# Patient Record
Sex: FEMALE | Race: WHITE | NOT HISPANIC OR LATINO | Employment: FULL TIME | URBAN - NONMETROPOLITAN AREA
[De-identification: names, ages, dates, MRNs, and addresses within clinical notes are randomized per-mention and may not be internally consistent; named-entity substitution may affect disease eponyms.]

---

## 2017-03-10 ENCOUNTER — OFFICE VISIT (OUTPATIENT)
Dept: MEDICAL GROUP | Facility: PHYSICIAN GROUP | Age: 22
End: 2017-03-10
Payer: COMMERCIAL

## 2017-03-10 VITALS
BODY MASS INDEX: 21.99 KG/M2 | TEMPERATURE: 97.5 F | SYSTOLIC BLOOD PRESSURE: 108 MMHG | OXYGEN SATURATION: 99 % | RESPIRATION RATE: 18 BRPM | DIASTOLIC BLOOD PRESSURE: 80 MMHG | HEIGHT: 65 IN | WEIGHT: 132 LBS | HEART RATE: 110 BPM

## 2017-03-10 DIAGNOSIS — Z00.00 HEALTHCARE MAINTENANCE: ICD-10-CM

## 2017-03-10 DIAGNOSIS — F17.200 TOBACCO USE DISORDER: ICD-10-CM

## 2017-03-10 PROCEDURE — 99203 OFFICE O/P NEW LOW 30 MIN: CPT | Performed by: NURSE PRACTITIONER

## 2017-03-10 ASSESSMENT — PAIN SCALES - GENERAL: PAINLEVEL: NO PAIN

## 2017-03-10 ASSESSMENT — PATIENT HEALTH QUESTIONNAIRE - PHQ9: CLINICAL INTERPRETATION OF PHQ2 SCORE: 0

## 2017-03-10 NOTE — PATIENT INSTRUCTIONS
Pick a quit date, start medication 1-2 weeks before then stop smoking on your quit date.    When you are about a week away from running out, have pharmacy call for refill on maintenance pack    Follow up with me in 3 months, sooner if needed--especially if you do not tolerate the side effects (dreams)    Continue with healthy eating, regular physical activity and maintaining your healthy weight

## 2017-03-10 NOTE — ASSESSMENT & PLAN NOTE
Patient states she has had her first Pap smear with her gynecologist in [place]. She does report to me a family history of ovarian cancer, specifically in her mother. She also reports to me a family history of breast cancer and her maternal grandmother as well as her maternal great-grandmother. She is currently on oral contraceptives. She did not get her flu shot this year, but will get one in the fall.  I will discuss with her at her follow-up appointment in one month the importance of her discussing with her gynecologist her family history of breast and ovarian cancer and possible need for early screening.

## 2017-03-10 NOTE — MR AVS SNAPSHOT
"        Jo Ann Vazqeuz   3/10/2017 11:20 AM   Office Visit   MRN: [id number]    Department:  [facility]   Dept Phone:  [phone]    Description:  Female : 1995   Provider:  BERNIE Navarro           Reason for Visit     New Patient est care and quit smoking.       Allergies as of 3/10/2017     No Known Allergies      You were diagnosed with     Tobacco use disorder   [305.1.ICD-9-CM]         Vital Signs     Blood Pressure Pulse Temperature Respirations Height Weight    108/80 mmHg 110 36.4 °C (97.5 °F) 18 1.638 m (5' 4.5\") 59.875 kg (132 lb)    Body Mass Index Oxygen Saturation Last Menstrual Period Breastfeeding? Smoking Status       22.32 kg/m2 99% 2017 No Current Every Day Smoker       Basic Information     Date Of Birth Sex Race Ethnicity Preferred Language    1995 Female White Non- English      Your appointments     2017 10:20 AM   Established Patient with BERNIE Navarro   [address] [address] [phone]           You will be receiving a confirmation call a few days before your appointment from our automated call confirmation system.              Problem List              ICD-10-CM Priority Class Noted - Resolved    Other psoriasis L40.8   2009 - Present    Tobacco use disorder F17.200   3/10/2017 - Present      Health Maintenance        Date Due Completion Dates    IMM HEP B VACCINE (1 of 3 - Primary Series) 1995 ---    IMM VARICELLA (CHICKENPOX) VACCINE (2 of 2 - 2 Dose Adolescent Series) 10/20/2009 2009    IMM HEP A VACCINE (2 of 2 - Standard Series) 3/22/2010 2009    IMM PNEUMOCOCCAL 19-64 (ADULT) MEDIUM RISK SERIES (1 of 1 - PPSV23) 2013 ---    PAP SMEAR 2015 ---    IMM INFLUENZA (1) 10/2/2017 (Originally 2016) ---    IMM DTaP/Tdap/Td Vaccine (2 - Td) 2019            Current Immunizations     HPV Quadrivalent " Vaccine (GARDASIL) 10/19/2007, 4/9/2007, 2/7/2007    Hepatitis A Vaccine, Ped/Adol 9/22/2009    Tdap Vaccine 9/22/2009    Varicella Vaccine Live 9/22/2009      Below and/or attached are the medications your provider expects you to take. Review all of your home medications and newly ordered medications with your provider and/or pharmacist. Follow medication instructions as directed by your provider and/or pharmacist. Please keep your medication list with you and share with your provider. Update the information when medications are discontinued, doses are changed, or new medications (including over-the-counter products) are added; and carry medication information at all times in the event of emergency situations     Allergies:  No Known Allergies          Medications  Valid as of: March 10, 2017 - 11:53 AM    Generic Name Brand Name Tablet Size Instructions for use    Norethindrone Acet-Ethinyl Est   Take  by mouth.        Varenicline Tartrate (Misc) CHANTIX ERIC 0.5 MG X 11 & 1 MG X 42 Take according to starter pack instructions        .                 Medicines prescribed today were sent to:     [facility] [address] [address]    Phone: [phone] Fax: [phone]    Open 24 Hours?: No    \[facility]\"" [facility] - [facility] [address] [place] [address]    [address] [place] [address]    Phone: [phone] Fax: [phone]    Open 24 Hours?: No      Medication refill instructions:       If your prescription bottle indicates you have medication refills left, it is not necessary to call your provider’s office. Please contact your pharmacy and they will refill your medication.    If your prescription bottle indicates you do not have any refills left, you may request refills at any time through one of the following ways: The online [employer]leep[employer] system (except Urgent Care), by calling your provider’s office, or by asking your pharmacy to contact your  provider’s office with a refill request. Medication refills are processed only during regular business hours and may not be available until the next business day. Your provider may request additional information or to have a follow-up visit with you prior to refilling your medication.   *Please Note: Medication refills are assigned a new Rx number when refilled electronically. Your pharmacy may indicate that no refills were authorized even though a new prescription for the same medication is available at the pharmacy. Please request the medicine by name with the pharmacy before contacting your provider for a refill.        Instructions    Pick a quit date, start medication 1-2 weeks before then stop smoking on your quit date.    When you are about a week away from running out, have pharmacy call for refill on maintenance pack    Follow up with me in 3 months, sooner if needed--especially if you do not tolerate the side effects (dreams)    Continue with healthy eating, regular physical activity and maintaining your healthy weight          MyChart Status: Patient Declined        Quit Tobacco Information     Do you want to quit using tobacco?    Quitting tobacco decreases risks of cancer, heart and lung disease, increases life expectancy, improves sense of taste and smell, and increases spending money, among other benefits.    If you are thinking about quitting, we can help.  • [facility] Quit Tobacco Program: [phone]  o Program occurs weekly for four weeks and includes pharmacist consultation on products to support quitting smoking or chewing tobacco. A provider referral is needed for pharmacist consultation.  • Tobacco Users Help Hotline: 5-800QUIT-NOW (960-5338) or https://[place].quitlogix.org/  o Free, confidential telephone and online coaching for [place] residents. Sessions are designed on a schedule that is convenient for you. Eligible clients receive free nicotine replacement therapy.  • Nationally:  www.smokefree.gov  o Information and professional assistance to support both immediate and long-term needs as you become, and remain, a non-smoker. Smokefree.gov allows you to choose the help that best fits your needs.

## 2017-03-10 NOTE — PROGRESS NOTES
"Chief Complaint   Patient presents with   • New Patient     est care and quit smoking.          This is a 21 y.o.female patient that presents today with the following: Establish care with new PCP, assistance with tobacco cessation.    Tobacco use disorder  Chronic in nature, current everyday smoker about 5-6 cigarettes per day. She has smoked for approximately 2 years. However she smokes daily varies depending on day and who she is around. She has tried vaping, the nicorette gum, the nicorette inhalers, weaning down on the cigarettes and \"cold turkey.\" Nothing has working. She understands the risks tobacco use and poses on her health and she is ready to quit. She has heard about Chantix and she wants to try this.  I did discuss with her at length the risks, benefits and common side effects associated with this medication. I did tell her the most common side effect but this is bizarre and often vivid dreams, sometimes even nightmares. I did tell her that if these are bothersome there is a medication that we can prescribe for that, doxazosin. She is to follow up with me in 3 months, sooner if needed. We will call and Chantix for her, she was instructed on how to take this medication.    Healthcare maintenance  Patient states she has had her first Pap smear with her gynecologist in [place]. She does report to me a family history of ovarian cancer, specifically in her mother. She also reports to me a family history of breast cancer and her maternal grandmother as well as her maternal great-grandmother. She is currently on oral contraceptives. She did not get her flu shot this year, but will get one in the fall.  I will discuss with her at her follow-up appointment in one month the importance of her discussing with her gynecologist her family history of breast and ovarian cancer and possible need for early screening.    clinical course has been stable    Past Medical History   Diagnosis Date   • Other psoriasis        Past " "Surgical History   Procedure Laterality Date   • Other       none reported       Family History   Problem Relation Age of Onset   • Diabetes Maternal Grandfather    • Cancer Mother      ovarian   • Cancer Maternal Grandmother        Review of patient's allergies indicates no known allergies.    Current Outpatient Prescriptions Ordered in [facility]   Medication Sig Dispense Refill   • Norethindrone Acet-Ethinyl Est (JUNEL 1/20 PO) Take  by mouth.     • varenicline (CHANTIX STARTING MONTH PAK) 0.5 MG X 11 & 1 MG X 42 tablet Take according to starter pack instructions 56 Tab 3     No current Epic-ordered facility-administered medications on file.       Constitutional ROS: No unexpected change in weight, No weakness, No unexplained fevers, sweats, or chills  Pulmonary ROS: No chronic cough, sputum, or hemoptysis, No shortness of breath, No recent change in breathing, Positive for smoker, current everyday--per history of present illness  Cardiovascular ROS: No exercise intolerance, No chest pain, No edema, No palpitations  Gastrointestinal ROS: No abdominal pain, No nausea, vomiting, diarrhea, or constipation, no blood in stool  Musculoskeletal/Extremities ROS: No clubbing, No cyanosis, No pain, redness or swelling on the joints  Neurologic ROS: Normal development, No seizures, No weakness  All other systems reviewed and are within normal limits    Physical exam:  /80 mmHg  Pulse 110  Temp(Src) 36.4 °C (97.5 °F)  Resp 18  Ht 1.638 m (5' 4.5\")  Wt 59.875 kg (132 lb)  BMI 22.32 kg/m2  SpO2 99%  LMP 02/17/2017  Breastfeeding? No  General Appearance: Young female, alert, no distress, well-nourished, well-groomed  Skin: Skin color, texture, turgor normal. No rashes or lesions.  Lungs: negative findings: normal respiratory rate and rhythm, lungs clear to auscultation  Heart: negative. RRR without murmur, gallop, or rubs.  No ectopy.  Abdomen: Abdomen soft, non-tender. BS normal. No masses,  No " organomegaly  Musculoskeletal: negative  Neurologic: intact, oriented, mood appropriate, judgment intact. Cranial nerves II through XII grossly intact    Medical decision making/discussion: Patient here to establish care with new PCP and is requesting assistance with tobacco cessation. We will will have her try Chantix. She was counseled on risks, benefits and common side effects of this medication. She was also instructed on how to take this medication appropriately. She is to follow-up with me in 3 months, sooner if needed, especially if she does not tolerate side effects associated with dreams. She is to continue with healthy eating, regular physical activity and maintain her healthy weight.    Jo Ann Spencer was seen today for new patient.    Diagnoses and all orders for this visit:    Tobacco use disorder  -     varenicline (CHANTIX STARTING MONTH ERIC) 0.5 MG X 11 & 1 MG X 42 tablet; Take according to starter pack instructions    Healthcare maintenance          Please note that this dictation was created using voice recognition software. I have made every reasonable attempt to correct obvious errors, but I expect that there are errors of grammar and possibly content that I did not discover before finalizing the note.

## 2017-03-10 NOTE — ASSESSMENT & PLAN NOTE
"Chronic in nature, current everyday smoker about 5-6 cigarettes per day. She has smoked for approximately 2 years. However she smokes daily varies depending on day and who she is around. She has tried vaping, the nicorette gum, the nicorette inhalers, weaning down on the cigarettes and \"cold turkey.\" Nothing has working. She understands the risks tobacco use and poses on her health and she is ready to quit. She has heard about Chantix and she wants to try this.  I did discuss with her at length the risks, benefits and common side effects associated with this medication. I did tell her the most common side effect but this is bizarre and often vivid dreams, sometimes even nightmares. I did tell her that if these are bothersome there is a medication that we can prescribe for that, doxazosin. She is to follow up with me in one month. We will call and Chantix for her, she was instructed on how to take this medication.  "

## 2017-07-31 ENCOUNTER — HOSPITAL ENCOUNTER (OUTPATIENT)
Facility: MEDICAL CENTER | Age: 22
End: 2017-07-31
Attending: OBSTETRICS & GYNECOLOGY
Payer: COMMERCIAL

## 2017-07-31 PROCEDURE — 88175 CYTOPATH C/V AUTO FLUID REDO: CPT

## 2017-07-31 PROCEDURE — 87491 CHLMYD TRACH DNA AMP PROBE: CPT

## 2017-07-31 PROCEDURE — 87624 HPV HI-RISK TYP POOLED RSLT: CPT

## 2017-07-31 PROCEDURE — 87591 N.GONORRHOEAE DNA AMP PROB: CPT

## 2017-08-01 LAB
C TRACH DNA GENITAL QL NAA+PROBE: NEGATIVE
CYTOLOGY REG CYTOL: NORMAL
N GONORRHOEA DNA GENITAL QL NAA+PROBE: NEGATIVE
SPECIMEN SOURCE: NORMAL

## 2017-08-04 LAB
HPV HR 12 DNA CVX QL NAA+PROBE: NEGATIVE
HPV16 DNA SPEC QL NAA+PROBE: NEGATIVE
HPV18 DNA SPEC QL NAA+PROBE: NEGATIVE
SPECIMEN SOURCE: NORMAL

## 2017-10-30 ENCOUNTER — HOSPITAL ENCOUNTER (OUTPATIENT)
Facility: MEDICAL CENTER | Age: 22
End: 2017-10-30
Attending: PHYSICIAN ASSISTANT
Payer: COMMERCIAL

## 2017-10-30 ENCOUNTER — OFFICE VISIT (OUTPATIENT)
Dept: URGENT CARE | Facility: PHYSICIAN GROUP | Age: 22
End: 2017-10-30
Payer: COMMERCIAL

## 2017-10-30 VITALS
DIASTOLIC BLOOD PRESSURE: 80 MMHG | RESPIRATION RATE: 20 BRPM | WEIGHT: 138.4 LBS | OXYGEN SATURATION: 98 % | SYSTOLIC BLOOD PRESSURE: 120 MMHG | HEIGHT: 65 IN | HEART RATE: 114 BPM | BODY MASS INDEX: 23.06 KG/M2 | TEMPERATURE: 99.8 F

## 2017-10-30 DIAGNOSIS — N12 PYELONEPHRITIS: Primary | ICD-10-CM

## 2017-10-30 DIAGNOSIS — R30.0 DYSURIA: ICD-10-CM

## 2017-10-30 DIAGNOSIS — N12 PYELONEPHRITIS: ICD-10-CM

## 2017-10-30 LAB
APPEARANCE UR: NORMAL
BILIRUB UR STRIP-MCNC: NORMAL MG/DL
COLOR UR AUTO: YELLOW
GLUCOSE UR STRIP.AUTO-MCNC: NORMAL MG/DL
KETONES UR STRIP.AUTO-MCNC: NORMAL MG/DL
LEUKOCYTE ESTERASE UR QL STRIP.AUTO: NORMAL
NITRITE UR QL STRIP.AUTO: NORMAL
PH UR STRIP.AUTO: 7.5 [PH] (ref 5–8)
PROT UR QL STRIP: 30 MG/DL
RBC UR QL AUTO: NORMAL
SP GR UR STRIP.AUTO: 1.01
UROBILINOGEN UR STRIP-MCNC: NORMAL MG/DL

## 2017-10-30 PROCEDURE — 87077 CULTURE AEROBIC IDENTIFY: CPT

## 2017-10-30 PROCEDURE — 87086 URINE CULTURE/COLONY COUNT: CPT

## 2017-10-30 PROCEDURE — 81002 URINALYSIS NONAUTO W/O SCOPE: CPT | Performed by: PHYSICIAN ASSISTANT

## 2017-10-30 PROCEDURE — 99203 OFFICE O/P NEW LOW 30 MIN: CPT | Performed by: PHYSICIAN ASSISTANT

## 2017-10-30 PROCEDURE — 87186 SC STD MICRODIL/AGAR DIL: CPT

## 2017-10-30 RX ORDER — PHENAZOPYRIDINE HYDROCHLORIDE 200 MG/1
200 TABLET, FILM COATED ORAL 3 TIMES DAILY
Qty: 6 TAB | Refills: 0 | Status: SHIPPED | OUTPATIENT
Start: 2017-10-30 | End: 2017-10-30 | Stop reason: SDUPTHER

## 2017-10-30 RX ORDER — CIPROFLOXACIN 500 MG/1
500 TABLET, FILM COATED ORAL EVERY 12 HOURS
Qty: 14 TAB | Refills: 0 | Status: SHIPPED | OUTPATIENT
Start: 2017-10-30 | End: 2017-10-30 | Stop reason: SDUPTHER

## 2017-10-30 RX ORDER — PHENAZOPYRIDINE HYDROCHLORIDE 200 MG/1
200 TABLET, FILM COATED ORAL 3 TIMES DAILY
Qty: 6 TAB | Refills: 0 | Status: SHIPPED | OUTPATIENT
Start: 2017-10-30 | End: 2017-11-01

## 2017-10-30 RX ORDER — CIPROFLOXACIN 500 MG/1
500 TABLET, FILM COATED ORAL EVERY 12 HOURS
Qty: 14 TAB | Refills: 0 | Status: SHIPPED | OUTPATIENT
Start: 2017-10-30 | End: 2017-11-06

## 2017-10-30 NOTE — PROGRESS NOTES
Chief Complaint   Patient presents with   • UTI     frequency, burning, back pain       HISTORY OF PRESENT ILLNESS: Patient is a 21 y.o. female who presents today becauseShe has a one week history of increased urinary urgency, frequency, dysuria, she also has right-sided flank pain. No fevers, chills, nausea, vomiting or diarrhea. She has tried some over-the-counter Azo, which did not help.    Patient Active Problem List    Diagnosis Date Noted   • Tobacco use disorder 03/10/2017   • Healthcare maintenance 03/10/2017   • Other psoriasis 09/16/2009       Allergies:Review of patient's allergies indicates no known allergies.    Current Outpatient Prescriptions Ordered in [facility]   Medication Sig Dispense Refill   • phenazopyridine (PYRIDIUM) 200 MG Tab Take 1 Tab by mouth 3 times a day for 2 days. 6 Tab 0   • ciprofloxacin (CIPRO) 500 MG Tab Take 1 Tab by mouth every 12 hours for 7 days. 14 Tab 0   • Norethindrone Acet-Ethinyl Est (JUNEL 1/20 PO) Take  by mouth.     • varenicline (CHANTIX STARTING MONTH PAK) 0.5 MG X 11 & 1 MG X 42 tablet Take according to starter pack instructions 56 Tab 3     No current Epic-ordered facility-administered medications on file.        Past Medical History:   Diagnosis Date   • Other psoriasis        Social History   Substance Use Topics   • Smoking status: Former Smoker     Packs/day: 0.15     Years: 2.00     Types: Cigarettes     Quit date: 6/1/2017   • Smokeless tobacco: Never Used   • Alcohol use No       Family Status   Relation Status   • Father Other    dad's side is unknown   • Maternal Grandfather Alive   • Mother Alive   • Maternal Grandmother      Family History   Problem Relation Age of Onset   • Diabetes Maternal Grandfather    • Cancer Mother      ovarian   • Cancer Maternal Grandmother        ROS:  Review of Systems   Constitutional: Negative for fever, chills, weight loss and malaise/fatigue.   HENT: Negative for ear pain, nosebleeds, congestion, sore throat and neck pain.   "  Eyes: Negative for blurred vision.   Respiratory: Negative for cough, sputum production, shortness of breath and wheezing.    Cardiovascular: Negative for chest pain, palpitations, orthopnea and leg swelling.   Gastrointestinal: Negative for heartburn, nausea, vomiting and abdominal pain.   Genitourinary:Positive for dysuria, urgency and frequency. Positive right flank pain    Exam:  Blood pressure 120/80, pulse (!) 114, temperature 37.7 °C (99.8 °F), resp. rate 20, height 1.638 m (5' 4.5\"), weight 62.8 kg (138 lb 6.4 oz), last menstrual period 10/20/2017, SpO2 98 %, not currently breastfeeding.  General:  Well nourished, well developed female in NAD  Head:Normocephalic, atraumatic  Eyes: PERRLA, EOM within normal limits, no conjunctival injection, no scleral icterus, visual fields and acuity grossly intact.  Extremities: no clubbing, cyanosis, or edema.    Urinalysis in the office is cloudy, large amount of leukocyte esterase, positive nitrites, 1+ protein, small amount of blood.    Please note that this dictation was created using voice recognition software. I have made every reasonable attempt to correct obvious errors, but I expect that there are errors of grammar and possibly content that I did not discover before finalizing the note.    Assessment/Plan:  1. Pyelonephritis  Urine Culture    ciprofloxacin (CIPRO) 500 MG Tab   2. Dysuria  POCT Urinalysis    phenazopyridine (PYRIDIUM) 200 MG Tab   , Increase by mouth fluids    Followup with primary care in the next 7-10 days if not significantly improving, return to the urgent care or go to the emergency room sooner for any worsening of symptoms.       "

## 2017-11-01 LAB
BACTERIA UR CULT: ABNORMAL
SIGNIFICANT IND 70042: ABNORMAL
SOURCE SOURCE: ABNORMAL

## 2018-10-20 ENCOUNTER — OFFICE VISIT (OUTPATIENT)
Dept: URGENT CARE | Facility: PHYSICIAN GROUP | Age: 23
End: 2018-10-20
Payer: COMMERCIAL

## 2018-10-20 VITALS
SYSTOLIC BLOOD PRESSURE: 116 MMHG | HEIGHT: 65 IN | BODY MASS INDEX: 23.32 KG/M2 | TEMPERATURE: 98.8 F | HEART RATE: 87 BPM | DIASTOLIC BLOOD PRESSURE: 70 MMHG | OXYGEN SATURATION: 100 % | RESPIRATION RATE: 12 BRPM | WEIGHT: 140 LBS

## 2018-10-20 DIAGNOSIS — Z32.02 PREGNANCY EXAMINATION OR TEST, NEGATIVE RESULT: ICD-10-CM

## 2018-10-20 LAB
INT CON NEG: NORMAL
INT CON POS: NORMAL
POC URINE PREGNANCY TEST: NORMAL

## 2018-10-20 PROCEDURE — 99212 OFFICE O/P EST SF 10 MIN: CPT | Performed by: NURSE PRACTITIONER

## 2018-10-20 PROCEDURE — 81025 URINE PREGNANCY TEST: CPT | Performed by: NURSE PRACTITIONER

## 2018-10-20 ASSESSMENT — ENCOUNTER SYMPTOMS: ABDOMINAL PAIN: 0

## 2018-10-20 NOTE — PROGRESS NOTES
"Subjective:      Jo Ann Vazquez is a 22 y.o. female who presents with Pregnancy Test            Patient comes in today requesting pregnancy test.  She is trying to conceive.  She has not yet missed her menses.  Menses due to start in 5-6 days.  She did note some light spotting yesterday.  No pelvic pain or vaginal discharge.  She took an at-home test today and it was faintly positive.          Review of Systems   Constitutional: Negative for malaise/fatigue.   Gastrointestinal: Negative for abdominal pain.     Medications, Allergies, and current problem list reviewed today in Epic     Objective:     /70 (BP Location: Left arm, Patient Position: Sitting, BP Cuff Size: Adult)   Pulse 87   Temp 37.1 °C (98.8 °F) (Temporal)   Resp 12   Ht 1.638 m (5' 4.5\")   Wt 63.5 kg (140 lb)   SpO2 100%   BMI 23.66 kg/m²      Physical Exam   Constitutional: She is oriented to person, place, and time. She appears well-developed and well-nourished. No distress.   Cardiovascular: Normal rate.    Pulmonary/Chest: Effort normal.   Neurological: She is alert and oriented to person, place, and time.   Skin: She is not diaphoretic.   Vitals reviewed.    POCT urine HCG: negative          Assessment/Plan:     1. Pregnancy examination or test, negative result  - POCT Pregnancy    Advised patient that in-clinic test is negative, but it is too early to rule out pregnancy.  Recommended repeat urine HCG in 5-6 days with first void specimen.  Take a prenatal vitamin.  Follow up with obstetrics routinely if pregnancy positive.  Patient verbalized understanding of and agreed with plan of care.  "

## 2019-01-28 ENCOUNTER — NON-PROVIDER VISIT (OUTPATIENT)
Dept: URGENT CARE | Facility: PHYSICIAN GROUP | Age: 24
End: 2019-01-28

## 2019-01-28 DIAGNOSIS — Z11.1 ENCOUNTER FOR PPD TEST: ICD-10-CM

## 2019-01-28 PROCEDURE — 86580 TB INTRADERMAL TEST: CPT | Performed by: PHYSICIAN ASSISTANT

## 2019-01-28 NOTE — NON-PROVIDER
Jo Ann Vazquez is a 23 y.o. female here for a non-provider visit for PPD placement -- Step 1 of 1    Reason for PPD:  work requirement    1. TB evaluation questionnaire completed by patient? Yes      -  If any answers marked yes did you contact a provider prior to placing? No  2.  Patient notified to return to clinic for reading on: 1/30/19 or 1/31/19  3.  PPD Placement documentation completed on TB evaluation questionnaire? Yes  4.  Location of TB evaluation questionnaire filed: [facility]

## 2019-01-30 ENCOUNTER — NON-PROVIDER VISIT (OUTPATIENT)
Dept: URGENT CARE | Facility: PHYSICIAN GROUP | Age: 24
End: 2019-01-30
Payer: COMMERCIAL

## 2019-01-30 LAB — TB WHEAL 3D P 5 TU DIAM: 0 MM

## 2019-01-31 NOTE — PROGRESS NOTES
Jo Ann Vazquez is a 23 y.o. female here for a non-provider visit for PPD reading -- Step 1 of 1.      1.  Resulted in Epic under enter/edit results? Yes   2.  TB evaluation questionnaire scanned into chart and original given to patient?Yes      3. Was induration greater than 0 mm? No.    Routed to PCP? Yes

## 2019-04-12 ENCOUNTER — HOSPITAL ENCOUNTER (INPATIENT)
Facility: MEDICAL CENTER | Age: 24
LOS: 2 days | DRG: 832 | End: 2019-04-14
Attending: OBSTETRICS & GYNECOLOGY | Admitting: OBSTETRICS & GYNECOLOGY
Payer: COMMERCIAL

## 2019-04-12 LAB
BASOPHILS # BLD AUTO: 0.5 % (ref 0–1.8)
BASOPHILS # BLD: 0.08 K/UL (ref 0–0.12)
EOSINOPHIL # BLD AUTO: 0.01 K/UL (ref 0–0.51)
EOSINOPHIL NFR BLD: 0.1 % (ref 0–6.9)
ERYTHROCYTE [DISTWIDTH] IN BLOOD BY AUTOMATED COUNT: 41.1 FL (ref 35.9–50)
HCT VFR BLD AUTO: 36.3 % (ref 37–47)
HGB BLD-MCNC: 12.2 G/DL (ref 12–16)
IMM GRANULOCYTES # BLD AUTO: 0.08 K/UL (ref 0–0.11)
IMM GRANULOCYTES NFR BLD AUTO: 0.5 % (ref 0–0.9)
LYMPHOCYTES # BLD AUTO: 0.89 K/UL (ref 1–4.8)
LYMPHOCYTES NFR BLD: 5.5 % (ref 22–41)
MCH RBC QN AUTO: 32.4 PG (ref 27–33)
MCHC RBC AUTO-ENTMCNC: 33.6 G/DL (ref 33.6–35)
MCV RBC AUTO: 96.5 FL (ref 81.4–97.8)
MONOCYTES # BLD AUTO: 0.44 K/UL (ref 0–0.85)
MONOCYTES NFR BLD AUTO: 2.7 % (ref 0–13.4)
NEUTROPHILS # BLD AUTO: 14.77 K/UL (ref 2–7.15)
NEUTROPHILS NFR BLD: 90.7 % (ref 44–72)
NRBC # BLD AUTO: 0 K/UL
NRBC BLD-RTO: 0 /100 WBC
PLATELET # BLD AUTO: 164 K/UL (ref 164–446)
PMV BLD AUTO: 9.4 FL (ref 9–12.9)
RBC # BLD AUTO: 3.76 M/UL (ref 4.2–5.4)
WBC # BLD AUTO: 16.3 K/UL (ref 4.8–10.8)

## 2019-04-12 PROCEDURE — 770002 HCHG ROOM/CARE - OB PRIVATE (112)

## 2019-04-12 PROCEDURE — 85025 COMPLETE CBC W/AUTO DIFF WBC: CPT

## 2019-04-12 PROCEDURE — 96366 THER/PROPH/DIAG IV INF ADDON: CPT

## 2019-04-12 PROCEDURE — 700105 HCHG RX REV CODE 258

## 2019-04-12 PROCEDURE — A9270 NON-COVERED ITEM OR SERVICE: HCPCS | Performed by: OBSTETRICS & GYNECOLOGY

## 2019-04-12 PROCEDURE — 700102 HCHG RX REV CODE 250 W/ 637 OVERRIDE(OP): Performed by: OBSTETRICS & GYNECOLOGY

## 2019-04-12 PROCEDURE — 36415 COLL VENOUS BLD VENIPUNCTURE: CPT

## 2019-04-12 PROCEDURE — 96375 TX/PRO/DX INJ NEW DRUG ADDON: CPT

## 2019-04-12 PROCEDURE — 700111 HCHG RX REV CODE 636 W/ 250 OVERRIDE (IP): Performed by: OBSTETRICS & GYNECOLOGY

## 2019-04-12 PROCEDURE — 96376 TX/PRO/DX INJ SAME DRUG ADON: CPT

## 2019-04-12 PROCEDURE — 700105 HCHG RX REV CODE 258: Performed by: OBSTETRICS & GYNECOLOGY

## 2019-04-12 PROCEDURE — 96365 THER/PROPH/DIAG IV INF INIT: CPT

## 2019-04-12 PROCEDURE — 87186 SC STD MICRODIL/AGAR DIL: CPT

## 2019-04-12 PROCEDURE — 87077 CULTURE AEROBIC IDENTIFY: CPT

## 2019-04-12 PROCEDURE — 87086 URINE CULTURE/COLONY COUNT: CPT

## 2019-04-12 RX ORDER — ONDANSETRON 4 MG/1
4 TABLET, ORALLY DISINTEGRATING ORAL EVERY 4 HOURS PRN
Status: DISCONTINUED | OUTPATIENT
Start: 2019-04-12 | End: 2019-04-14 | Stop reason: HOSPADM

## 2019-04-12 RX ORDER — SODIUM CHLORIDE 9 MG/ML
INJECTION, SOLUTION INTRAVENOUS
Status: COMPLETED
Start: 2019-04-12 | End: 2019-04-12

## 2019-04-12 RX ORDER — OXYCODONE HYDROCHLORIDE 10 MG/1
10 TABLET ORAL EVERY 4 HOURS PRN
Status: DISCONTINUED | OUTPATIENT
Start: 2019-04-12 | End: 2019-04-13

## 2019-04-12 RX ORDER — OXYCODONE HYDROCHLORIDE 5 MG/1
5 TABLET ORAL EVERY 4 HOURS PRN
Status: DISCONTINUED | OUTPATIENT
Start: 2019-04-12 | End: 2019-04-12

## 2019-04-12 RX ORDER — SODIUM CHLORIDE, SODIUM LACTATE, POTASSIUM CHLORIDE, CALCIUM CHLORIDE 600; 310; 30; 20 MG/100ML; MG/100ML; MG/100ML; MG/100ML
INJECTION, SOLUTION INTRAVENOUS
Status: COMPLETED
Start: 2019-04-12 | End: 2019-04-12

## 2019-04-12 RX ORDER — SODIUM CHLORIDE, SODIUM LACTATE, POTASSIUM CHLORIDE, CALCIUM CHLORIDE 600; 310; 30; 20 MG/100ML; MG/100ML; MG/100ML; MG/100ML
1000 INJECTION, SOLUTION INTRAVENOUS CONTINUOUS
Status: DISCONTINUED | OUTPATIENT
Start: 2019-04-12 | End: 2019-04-14 | Stop reason: HOSPADM

## 2019-04-12 RX ORDER — CEFAZOLIN SODIUM 1 G/3ML
1 INJECTION, POWDER, FOR SOLUTION INTRAMUSCULAR; INTRAVENOUS EVERY 8 HOURS
Status: DISCONTINUED | OUTPATIENT
Start: 2019-04-12 | End: 2019-04-14 | Stop reason: HOSPADM

## 2019-04-12 RX ADMIN — ONDANSETRON 4 MG: 4 TABLET, ORALLY DISINTEGRATING ORAL at 16:10

## 2019-04-12 RX ADMIN — SODIUM CHLORIDE 100 ML: 9 INJECTION, SOLUTION INTRAVENOUS at 16:12

## 2019-04-12 RX ADMIN — OXYCODONE HYDROCHLORIDE 5 MG: 5 TABLET ORAL at 18:19

## 2019-04-12 RX ADMIN — CEFAZOLIN 1 G: 1 INJECTION, POWDER, FOR SOLUTION INTRAMUSCULAR; INTRAVENOUS; PARENTERAL at 22:15

## 2019-04-12 RX ADMIN — FENTANYL CITRATE 100 MCG: 50 INJECTION, SOLUTION INTRAMUSCULAR; INTRAVENOUS at 16:09

## 2019-04-12 RX ADMIN — FENTANYL CITRATE 100 MCG: 50 INJECTION, SOLUTION INTRAMUSCULAR; INTRAVENOUS at 18:42

## 2019-04-12 RX ADMIN — CEFAZOLIN 1 G: 1 INJECTION, POWDER, FOR SOLUTION INTRAMUSCULAR; INTRAVENOUS; PARENTERAL at 16:11

## 2019-04-12 RX ADMIN — SODIUM CHLORIDE 100 ML: 900 INJECTION INTRAVENOUS at 22:15

## 2019-04-12 RX ADMIN — SODIUM CHLORIDE, POTASSIUM CHLORIDE, SODIUM LACTATE AND CALCIUM CHLORIDE 1000 ML: 600; 310; 30; 20 INJECTION, SOLUTION INTRAVENOUS at 16:13

## 2019-04-12 RX ADMIN — OXYCODONE HYDROCHLORIDE 10 MG: 10 TABLET ORAL at 21:15

## 2019-04-12 RX ADMIN — ONDANSETRON 4 MG: 4 TABLET, ORALLY DISINTEGRATING ORAL at 20:21

## 2019-04-12 RX ADMIN — SODIUM CHLORIDE, POTASSIUM CHLORIDE, SODIUM LACTATE AND CALCIUM CHLORIDE 1000 ML: 600; 310; 30; 20 INJECTION, SOLUTION INTRAVENOUS at 16:11

## 2019-04-12 RX ADMIN — SODIUM CHLORIDE, POTASSIUM CHLORIDE, SODIUM LACTATE AND CALCIUM CHLORIDE 1000 ML: 600; 310; 30; 20 INJECTION, SOLUTION INTRAVENOUS at 18:42

## 2019-04-12 ASSESSMENT — PATIENT HEALTH QUESTIONNAIRE - PHQ9
1. LITTLE INTEREST OR PLEASURE IN DOING THINGS: NOT AT ALL
SUM OF ALL RESPONSES TO PHQ9 QUESTIONS 1 AND 2: 0
SUM OF ALL RESPONSES TO PHQ9 QUESTIONS 1 AND 2: 0
2. FEELING DOWN, DEPRESSED, IRRITABLE, OR HOPELESS: NOT AT ALL
1. LITTLE INTEREST OR PLEASURE IN DOING THINGS: NOT AT ALL
2. FEELING DOWN, DEPRESSED, IRRITABLE, OR HOPELESS: NOT AT ALL

## 2019-04-12 ASSESSMENT — LIFESTYLE VARIABLES: EVER_SMOKED: NEVER

## 2019-04-12 NOTE — H&P
DATE OF ADMISSION:  04/12/2019    CHIEF COMPLAINT:  Back pain.    HISTORY OF PRESENT ILLNESS:  Patient is a 23-year-old primigravida female who   is due on 07/05/2019 who was sent over from the office at 28 weeks' gestation   with severe back pain, suspicion for pyelonephritis.  The patient has had a   history of frequent urinary tract infections in the past.  She gives a history   of having woken in pain this morning with the right lower back radiating to   the front.  She has also had nausea and vomiting.  She denies fever.    PRENATAL HISTORY:  The patient began prenatal care at 8 weeks' gestational age   and dating is based on last menstrual period and confirmed with early   ultrasound.    PAST MEDICAL HISTORY:  The patient is in good health with no significant   medical illnesses.    ALLERGIES:  No known drug allergies.    FAMILY HISTORY:  Noncontributory.    SOCIAL HISTORY:  She is single, is attended to in the labor and delivery by   her mother.  She works as a dental assistant.  Denies tobacco use.    REVIEW OF SYSTEMS:  She has had no leaking of fluid, vaginal bleeding or   contractions.    PHYSICAL EXAMINATION:  VITAL SIGNS:  She is afebrile.  GENERAL:  She is in mild distress, alert and oriented x3.  LUNGS:  Clear.  HEART:  Regular rate and rhythm.  SPINE:  There is right CVA tenderness, moderate in nature.  ABDOMEN:  Gravid with fetal heart tones present.  GYNECOLOGIC:  Not performed.    IMPRESSION:  Pregnancy at 28 weeks' gestation with possible pyelonephritis   versus nephrolithiasis.    PLAN:  The patient will be started on IV antibiotics and IV hydration,   medicate for pain as needed and we will send urine culture.  We will obtain   CBC, watch for signs of infection or improvement with antibiotics.  I did   discuss with her and her mother that it is possible she could have a stone   based on her symptoms and her response to the antibiotics and hydration will   help to determine this.        ____________________________________     MD MARIANA BARNARD / ALFONSO    DD:  04/12/2019 15:43:54  DT:  04/12/2019 16:43:53    D#:  [id number]  Job#:  [id number]

## 2019-04-13 ENCOUNTER — APPOINTMENT (OUTPATIENT)
Dept: RADIOLOGY | Facility: MEDICAL CENTER | Age: 24
DRG: 832 | End: 2019-04-13
Attending: OBSTETRICS & GYNECOLOGY
Payer: COMMERCIAL

## 2019-04-13 PROCEDURE — 59025 FETAL NON-STRESS TEST: CPT

## 2019-04-13 PROCEDURE — 302790 HCHG STAT ANTEPARTUM CARE, DAILY

## 2019-04-13 PROCEDURE — 700105 HCHG RX REV CODE 258

## 2019-04-13 PROCEDURE — 770002 HCHG ROOM/CARE - OB PRIVATE (112)

## 2019-04-13 PROCEDURE — 76775 US EXAM ABDO BACK WALL LIM: CPT

## 2019-04-13 PROCEDURE — 700102 HCHG RX REV CODE 250 W/ 637 OVERRIDE(OP): Performed by: OBSTETRICS & GYNECOLOGY

## 2019-04-13 PROCEDURE — A9270 NON-COVERED ITEM OR SERVICE: HCPCS | Performed by: OBSTETRICS & GYNECOLOGY

## 2019-04-13 PROCEDURE — 700105 HCHG RX REV CODE 258: Performed by: OBSTETRICS & GYNECOLOGY

## 2019-04-13 PROCEDURE — 700111 HCHG RX REV CODE 636 W/ 250 OVERRIDE (IP): Performed by: OBSTETRICS & GYNECOLOGY

## 2019-04-13 RX ORDER — OXYCODONE HYDROCHLORIDE 5 MG/1
5 TABLET ORAL EVERY 4 HOURS PRN
Status: DISCONTINUED | OUTPATIENT
Start: 2019-04-13 | End: 2019-04-14 | Stop reason: HOSPADM

## 2019-04-13 RX ORDER — SODIUM CHLORIDE 9 MG/ML
INJECTION, SOLUTION INTRAVENOUS
Status: COMPLETED
Start: 2019-04-13 | End: 2019-04-13

## 2019-04-13 RX ADMIN — SODIUM CHLORIDE 100 ML: 900 INJECTION INTRAVENOUS at 13:42

## 2019-04-13 RX ADMIN — OXYCODONE HYDROCHLORIDE 10 MG: 10 TABLET ORAL at 06:03

## 2019-04-13 RX ADMIN — OXYCODONE HYDROCHLORIDE 10 MG: 10 TABLET ORAL at 11:20

## 2019-04-13 RX ADMIN — CEFAZOLIN 1 G: 1 INJECTION, POWDER, FOR SOLUTION INTRAMUSCULAR; INTRAVENOUS; PARENTERAL at 06:03

## 2019-04-13 RX ADMIN — SODIUM CHLORIDE, POTASSIUM CHLORIDE, SODIUM LACTATE AND CALCIUM CHLORIDE 1000 ML: 600; 310; 30; 20 INJECTION, SOLUTION INTRAVENOUS at 21:42

## 2019-04-13 RX ADMIN — CEFAZOLIN 1 G: 1 INJECTION, POWDER, FOR SOLUTION INTRAMUSCULAR; INTRAVENOUS; PARENTERAL at 21:42

## 2019-04-13 RX ADMIN — SODIUM CHLORIDE, POTASSIUM CHLORIDE, SODIUM LACTATE AND CALCIUM CHLORIDE 1000 ML: 600; 310; 30; 20 INJECTION, SOLUTION INTRAVENOUS at 11:21

## 2019-04-13 RX ADMIN — SODIUM CHLORIDE 100 ML: 900 INJECTION INTRAVENOUS at 21:42

## 2019-04-13 RX ADMIN — OXYCODONE HYDROCHLORIDE 10 MG: 10 TABLET ORAL at 01:15

## 2019-04-13 RX ADMIN — CEFAZOLIN 1 G: 1 INJECTION, POWDER, FOR SOLUTION INTRAMUSCULAR; INTRAVENOUS; PARENTERAL at 13:43

## 2019-04-13 RX ADMIN — SODIUM CHLORIDE 100 ML: 900 INJECTION INTRAVENOUS at 06:03

## 2019-04-13 RX ADMIN — OXYCODONE HYDROCHLORIDE 5 MG: 5 TABLET ORAL at 17:48

## 2019-04-13 RX ADMIN — SODIUM CHLORIDE, POTASSIUM CHLORIDE, SODIUM LACTATE AND CALCIUM CHLORIDE 1000 ML: 600; 310; 30; 20 INJECTION, SOLUTION INTRAVENOUS at 03:26

## 2019-04-13 ASSESSMENT — PATIENT HEALTH QUESTIONNAIRE - PHQ9
SUM OF ALL RESPONSES TO PHQ9 QUESTIONS 1 AND 2: 0
1. LITTLE INTEREST OR PLEASURE IN DOING THINGS: NOT AT ALL
2. FEELING DOWN, DEPRESSED, IRRITABLE, OR HOPELESS: NOT AT ALL
SUM OF ALL RESPONSES TO PHQ9 QUESTIONS 1 AND 2: 0
1. LITTLE INTEREST OR PLEASURE IN DOING THINGS: NOT AT ALL
2. FEELING DOWN, DEPRESSED, IRRITABLE, OR HOPELESS: NOT AT ALL

## 2019-04-13 NOTE — PROGRESS NOTES
1900: report received from Kristi PUENTE. POC dicussed, care assumed.  2104: Pt in 6/10 pain, Dr Olson made aware, orders received to change oxycodone 5mg to oxycodone 10 mg.   2115: pain medication given.

## 2019-04-13 NOTE — CARE PLAN
Problem: Risk for injury  Goal: Patient and fetus will be free of preventable injury/complications  Outcome: PROGRESSING AS EXPECTED  Pt remains free of injury

## 2019-04-13 NOTE — PROGRESS NOTES
0700 Report received, spoke to Dr Olson regarding US and renal US was ordered.  0730 spoke to pt about POC.  0800 strainer given to pt and instructions to use it was explained.  0900 US done.  1600 Dr Olson was given report regarding US report and culture report and the plan of care is to give nima antibiotic till tomorrow that pt feels better and discharge pt home in the morning. This plan was discussed with pt and family.  1900 report given to Wilfred PUENTE.

## 2019-04-13 NOTE — CARE PLAN
Problem: Communication  Goal: The ability to communicate needs accurately and effectively will improve  Outcome: PROGRESSING AS EXPECTED  POC was discussed with pt and her mother.

## 2019-04-13 NOTE — PROGRESS NOTES
HD#2    Pt states she is having less pain, feeling better and able to keep oral feeds down    She remains afebrile  FHT cat I  Less CVAT    Improving clinical condition  Diagnosis still uncertain    Will obtain US of kidney/ureter to see if possible stone  Will continue IV antibiotics to treat for possible pyelo

## 2019-04-13 NOTE — PROGRESS NOTES
Pt settled all orders completed pt medicated for cont pain will try to help pt relax pts mother at bedside supportive Report given to Noc shift at 1900 pt care cont.

## 2019-04-13 NOTE — CARE PLAN
Problem: Pain  Goal: Alleviation of Pain or a reduction in pain to the patient's comfort goal    Intervention: Pain Management--Medications  Pt calls out when pain medication needed

## 2019-04-14 VITALS
DIASTOLIC BLOOD PRESSURE: 55 MMHG | WEIGHT: 163 LBS | HEIGHT: 64 IN | BODY MASS INDEX: 27.83 KG/M2 | TEMPERATURE: 98.6 F | SYSTOLIC BLOOD PRESSURE: 103 MMHG | RESPIRATION RATE: 20 BRPM | HEART RATE: 91 BPM

## 2019-04-14 PROBLEM — O23.03 PYELONEPHRITIS AFFECTING PREGNANCY IN THIRD TRIMESTER: Status: RESOLVED | Noted: 2019-04-14 | Resolved: 2019-04-14

## 2019-04-14 PROBLEM — O23.03 PYELONEPHRITIS AFFECTING PREGNANCY IN THIRD TRIMESTER: Status: ACTIVE | Noted: 2019-04-14

## 2019-04-14 LAB
BACTERIA UR CULT: ABNORMAL
BACTERIA UR CULT: ABNORMAL
SIGNIFICANT IND 70042: ABNORMAL
SITE SITE: ABNORMAL
SOURCE SOURCE: ABNORMAL

## 2019-04-14 PROCEDURE — 700105 HCHG RX REV CODE 258: Performed by: OBSTETRICS & GYNECOLOGY

## 2019-04-14 PROCEDURE — 700105 HCHG RX REV CODE 258

## 2019-04-14 PROCEDURE — 700111 HCHG RX REV CODE 636 W/ 250 OVERRIDE (IP): Performed by: OBSTETRICS & GYNECOLOGY

## 2019-04-14 PROCEDURE — 302790 HCHG STAT ANTEPARTUM CARE, DAILY

## 2019-04-14 PROCEDURE — 59025 FETAL NON-STRESS TEST: CPT

## 2019-04-14 RX ORDER — SODIUM CHLORIDE 9 MG/ML
INJECTION, SOLUTION INTRAVENOUS
Status: COMPLETED
Start: 2019-04-14 | End: 2019-04-14

## 2019-04-14 RX ORDER — NITROFURANTOIN MACROCRYSTALS 50 MG/1
100 CAPSULE ORAL DAILY
Qty: 60 CAP | Refills: 3 | Status: SHIPPED | OUTPATIENT
Start: 2019-04-14 | End: 2020-04-08

## 2019-04-14 RX ORDER — AMOXICILLIN AND CLAVULANATE POTASSIUM 875; 125 MG/1; MG/1
1 TABLET, FILM COATED ORAL 2 TIMES DAILY
Qty: 20 TAB | Refills: 0 | Status: ON HOLD | OUTPATIENT
Start: 2019-04-14 | End: 2019-06-19

## 2019-04-14 RX ADMIN — CEFAZOLIN 1 G: 1 INJECTION, POWDER, FOR SOLUTION INTRAMUSCULAR; INTRAVENOUS; PARENTERAL at 06:12

## 2019-04-14 RX ADMIN — SODIUM CHLORIDE, POTASSIUM CHLORIDE, SODIUM LACTATE AND CALCIUM CHLORIDE 1000 ML: 600; 310; 30; 20 INJECTION, SOLUTION INTRAVENOUS at 06:12

## 2019-04-14 RX ADMIN — SODIUM CHLORIDE 100 ML: 900 INJECTION INTRAVENOUS at 06:12

## 2019-04-14 NOTE — DISCHARGE SUMMARY
DATE OF DISCHARGE:  04/14/2019    DIAGNOSES:  1.  Pregnancy at 28 weeks gestation, not delivered.  2.  Pyelonephritis.    ADMISSION HISTORY:  The patient is a 23-year-old primigravida female who was   sent to labor and delivery from our office with flank pain and suspicion for   pyelonephritis.  She was afebrile.  She did have a history of previous urinary   tract infections.  She was treated with IV antibiotics in the form of Ancef.    Her urine was sent for culture and sensitivity.  She had a significant pain   and was not able to keep fluid down on initial admission.  Her clinical course   gradually improved over the next 48 hours.  She remained afebrile.  Her pain   improved where she was on no pain medication for the last 24 hours.  Her urine   culture did retain growth of a gram negative lianna that was pending at the time   of discharge.  An ultrasound did show mild right hydronephrosis, but no other   abnormalities.    ASSESSMENT AND PLAN:  The impression at discharge was pyelonephritin.  The plan will be to send her home with a 10-day course   of Augmentin 875, she will then remain on chronic urinary suppression in the   form of Macrodantin 100 mg p.o. daily.  She will follow up with Dr. Betancur   for a routine visit.  She will remain with normal diet and activity.       ____________________________________     MD MARIANA BARNARD / ALFONSO    DD:  04/14/2019 09:36:20  DT:  04/14/2019 13:14:16    D#:  [id number]  Job#:  [id number]

## 2019-04-14 NOTE — DISCHARGE INSTRUCTIONS
Pregnancy and Urinary Tract Infection  WHAT IS A URINARY TRACT INFECTION?  A urinary tract infection (UTI) is an infection of any part of the urinary tract. This includes the kidneys, the tubes that connect your kidneys to your bladder (ureters), the bladder, and the tube that carries urine out of your body (urethra). These organs make, store, and get rid of urine in the body. A UTI can be a bladder infection (cystitis) or a kidney infection (pyelonephritis). This infection may be caused by fungi, viruses, and bacteria. Bacteria are the most common cause of UTIs.  You are more likely to develop a UTI during pregnancy because:  The physical and hormonal changes your body goes through can make it easier for bacteria to get into your urinary tract.  Your growing baby puts pressure on your uterus and can affect urine flow.  DOES A UTI PLACE MY BABY AT RISK?  An untreated UTI during pregnancy could lead to a kidney infection, which can cause health problems that could affect your baby. Possible complications of an untreated UTI include:  Having your baby before 37 weeks of pregnancy (premature).  Having a baby with a low birth weight.  Developing high blood pressure during pregnancy (preeclampsia).  WHAT ARE THE SYMPTOMS OF A UTI?  Symptoms of a UTI include:  Fever.  Frequent urination or passing small amounts of urine frequently.  Needing to urinate urgently.  Pain or a burning sensation with urination.  Urine that smells bad or unusual.  Cloudy urine.  Pain in the lower abdomen or back.  Trouble urinating.  Blood in the urine.  Vomiting or being less hungry than normal.  Diarrhea or abdominal pain.  Vaginal discharge.  WHAT ARE THE TREATMENT OPTIONS FOR A UTI DURING PREGNANCY?  Treatment for this condition may include:  Antibiotic medicines that are safe to take during pregnancy.  Other medicines to treat less common causes of UTI.  HOW CAN I PREVENT A UTI?  To prevent a UTI:  Go to the bathroom as soon as you feel the  need.  Always wipe from front to back.  Wash your genital area with soap and warm water daily.  Empty your bladder before and after sex.  Wear cotton underwear.  Limit your intake of high sugar foods or drinks, such as regular soda, juice, and sweets..  Drink 6-8 glasses of water daily.  Do not wear tight-fitting pants.  Do not douche or use deodorant sprays.  Do not drink alcohol, caffeine, or carbonated drinks. These can irritate the bladder.  WHEN SHOULD I SEEK MEDICAL CARE?  Seek medical care if:  Your symptoms do not improve or get worse.  You have a fever after two days of treatment.  You have a rash.  You have abnormal vaginal discharge.  You have back or side pain.  You have chills.  You have nausea and vomiting.  WHEN SHOULD I SEEK IMMEDIATE MEDICAL CARE?  Seek immediate medical care if you are pregnant and:  You feel contractions in your uterus.  You have lower belly pain.  You have a gush of fluid from your vagina.  You have blood in your urine.  You are vomiting and cannot keep down any medicines or water.  This information is not intended to replace advice given to you by your health care provider. Make sure you discuss any questions you have with your health care provider.  Document Released: 2012 Document Revised: 2017 Document Reviewed: 2016  [employer] Interactive Patient Education © 2017 [employer]   Birth   birth is a birth that happens before 37 weeks of pregnancy. Most pregnancies last about 39-41 weeks. Every week in the womb is important and is beneficial to the health of the infant. Infants born before 37 weeks of pregnancy are at a higher risk for complications. Depending on when the infant was born, he or she may be:  · Late . Born between 32 weeks and 37 weeks of pregnancy.  · Very . Born at less than 32 weeks of pregnancy.  · Extremely . Born at less than 25 weeks of pregnancy.  The earlier a baby is born, the more likely the child  will have issues related to prematurity. Complications and problems that can be seen in infants born too early include:  · Problems breathing (respiratory distress syndrome).  · Low birth weight.  · Problems feeding.  · Sleeping problems.  · Yellowing of the skin (jaundice).  · Infections such as pneumonia.  Babies born very  or extremely  are at risk for more serious medical issues. These include:  · More severe breathing issues.  · Eyesight issues.  · Brain development issues (intraventricular hemorrhage).  · Behavioral and emotional development issues.  · Growth and developmental delays.  · Cerebral palsy.  · Serious feeding or bowel complications (necrotizing enterocolitis).  What are the causes?  There are two broad categories of  birth.  · Spontaneous  birth. This is a birth resulting from  labor (not medically induced) or  premature rupture of membranes (PPROM).  · Indicated  birth. This is a birth resulting from labor being medically induced due to health, personal, or social reasons.  What increases the risk?   birth may be related to certain medical conditions, lifestyle factors, or demographic factors encountered by the mother or fetus.  · Medical conditions include:  ¨ Multiple gestations (twins, triplets, and so on).  ¨ Infection.  ¨ Diabetes.  ¨ Heart disease.  ¨ Kidney disease.  ¨ Cervical or uterine abnormalities.  ¨ Being underweight.  ¨ High blood pressure or preeclampsia.  ¨ Premature rupture of membranes (PROM).  ¨ Birth defects in the fetus.  · Lifestyle factors include:  ¨ Poor prenatal care.  ¨ Poor nutrition or anemia.  ¨ Cigarette smoking.  ¨ Consuming alcohol.  ¨ High levels of stress and lack of social or emotional support.  ¨ Exposure to chemical or environmental toxins.  ¨ Substance abuse.  · Demographic factors include:  ¨ -American ethnicity.  ¨ Age (younger than 18 or older than 35 years of age).  ¨ Low socioeconomic  status.  Women with a history of  labor or who become pregnant within 18 months of giving birth are also at increased risk for  birth.  How is this diagnosed?  Your health care provider may request additional tests to diagnose underlying complications resulting from  birth. Tests on the infant may include:  · Physical exam.  · Blood tests.  · Chest X-rays.  · Heart-lung monitoring.  How is this treated?  After birth, special care will be taken to assess any problems or complications for the infant. Supportive care will be provided for the infant. Treatment depends on what problems are present and any complications that develop. Some  infants are cared for in a  intensive care unit. In general, care may include:  · Maintaining temperature and oxygen in a clear heated box (baby isolette).  · Monitoring the infant's heart rate, breathing, and level of oxygen in the blood.  · Monitoring for signs of infection and, if needed, giving IV antibiotic medicine.  · Inserting a feeding tube (nose, mouth) or giving IV nutrition if unable to feed.  · Inserting a breathing tube (ventilation).  · Respiration support (continuous positive airway pressure [CPAP] or oxygen).  Treatment will change as the infant builds up strength and is able to breathe and eat on his or her own. For some infants, no special treatment is necessary. Parents may be educated on the potential health risks of prematurity to the infant.  Follow these instructions at home:  · Understand your infant's special conditions and needs. It may be reassuring to learn about infant CPR.  · Monitor your infant in the car seat until he or she grows and matures. Infant car seats can cause breathing difficulties for  infants.  · Keep your infant warm. Dress your infant in layers and keep him or her away from drafts, especially in cold months of the year.  · Wash your hands thoroughly after going to the bathroom or changing a  diaper. Late  infants may be more prone to infection.  · Follow all your health care provider's instructions for providing support and care to your  infant.  · Get support from organizations and groups that understand your challenges.  · Follow up with your infant's health care provider as directed.  Prevention   There are some things you can do to help lower your risk of having a  infant in the future. These include:  · Good prenatal care throughout the entire pregnancy. See a health care provider regularly for advice and tests.  · Management of underlying medical conditions.  · Proper self-care and lifestyle changes.  · Proper diet and weight control.  · Watching for signs of various infections.  Where to find more information:  : www.[employer]  Prematurity.org: www.prematurity.org  Contact a health care provider if:  · Your infant has feeding difficulties.  · Your infant has sleeping difficulties.  · Your infant has breathing difficulties.  · Your infant's skin starts to look yellow.  · Your infant shows signs of infection, such as a stuffy nose, fever, crying, or bluish color of the skin.  This information is not intended to replace advice given to you by your health care provider. Make sure you discuss any questions you have with your health care provider.  Document Released: 2005 Document Revised: 2017 Document Reviewed: 2014  [employer] Interactive Patient Education © 2017 [employer].

## 2019-04-14 NOTE — PROGRESS NOTES
"HD#3    Pt is feeling much better today  Has used no pain meds for past day  Has only mild \"soreness\" in her back    VS afebrile  Mild CVAT on exam  Urine culture is growing Gram neg lianna  US shows only hydronephrosis    Pyelonephritis improving    Discharge home on augmentin 875 for 10day  She will then stay on macrodantin daily for chronic suppressive therapy  Follow up with Dr Betancru for routine care  "

## 2019-04-14 NOTE — CARE PLAN
Problem: Infection  Goal: Will remain free from infection    Intervention: Implement standard precautions and perform hand washing before and after patient contact  Hand hygiene performed appropriately, education provided       Problem: Fatigue  Goal: Patient will use techniques to conserve energy    Intervention: Plan care to limit interruptions  Group cares allow time for pt to rest

## 2019-04-14 NOTE — PROGRESS NOTES
"0700 Report received from LAKHWINDER Oneil RN     0800 Assessment completed. Heat packs used for back \"soreness\".     0940 Received orders to d/c patient. Per Dr Olson pt does not need 1400 IV Ancef dose. Okay to d/c home after reactive NST.     0958 Reactive NST.     1015 Pt educated on discharge instructions including how to take prescription medications and to return to L&D for either: LOF, VB, UCs, lower abd pain, decreased FM, fever, or s/s of worsening UTI. Pt reviewed discharge paperwork with RN and signed paperwork. Pt verbalized understanding of d/c instructions. Pt declined w/c. Mother of pt at bedside. Pt ambulated off unit with all personal belongings, d/c paperwork, and prescriptions.   "

## 2019-06-19 ENCOUNTER — HOSPITAL ENCOUNTER (INPATIENT)
Facility: MEDICAL CENTER | Age: 24
LOS: 2 days | End: 2019-06-21
Attending: OBSTETRICS & GYNECOLOGY | Admitting: OBSTETRICS & GYNECOLOGY
Payer: COMMERCIAL

## 2019-06-19 ENCOUNTER — ANESTHESIA EVENT (OUTPATIENT)
Dept: OBGYN | Facility: MEDICAL CENTER | Age: 24
End: 2019-06-19
Payer: COMMERCIAL

## 2019-06-19 ENCOUNTER — ANESTHESIA (OUTPATIENT)
Dept: OBGYN | Facility: MEDICAL CENTER | Age: 24
End: 2019-06-19
Payer: COMMERCIAL

## 2019-06-19 LAB
BASOPHILS # BLD AUTO: 0.4 % (ref 0–1.8)
BASOPHILS # BLD: 0.04 K/UL (ref 0–0.12)
CRYSTALS AMN MICRO: NORMAL
EOSINOPHIL # BLD AUTO: 0.07 K/UL (ref 0–0.51)
EOSINOPHIL NFR BLD: 0.7 % (ref 0–6.9)
ERYTHROCYTE [DISTWIDTH] IN BLOOD BY AUTOMATED COUNT: 40 FL (ref 35.9–50)
HCT VFR BLD AUTO: 35.9 % (ref 37–47)
HGB BLD-MCNC: 12.9 G/DL (ref 12–16)
HOLDING TUBE BB 8507: NORMAL
IMM GRANULOCYTES # BLD AUTO: 0.02 K/UL (ref 0–0.11)
IMM GRANULOCYTES NFR BLD AUTO: 0.2 % (ref 0–0.9)
LYMPHOCYTES # BLD AUTO: 1.54 K/UL (ref 1–4.8)
LYMPHOCYTES NFR BLD: 16.5 % (ref 22–41)
MCH RBC QN AUTO: 32.9 PG (ref 27–33)
MCHC RBC AUTO-ENTMCNC: 35.9 G/DL (ref 33.6–35)
MCV RBC AUTO: 91.6 FL (ref 81.4–97.8)
MONOCYTES # BLD AUTO: 0.36 K/UL (ref 0–0.85)
MONOCYTES NFR BLD AUTO: 3.8 % (ref 0–13.4)
NEUTROPHILS # BLD AUTO: 7.33 K/UL (ref 2–7.15)
NEUTROPHILS NFR BLD: 78.4 % (ref 44–72)
NRBC # BLD AUTO: 0 K/UL
NRBC BLD-RTO: 0 /100 WBC
PLATELET # BLD AUTO: 166 K/UL (ref 164–446)
PMV BLD AUTO: 10.6 FL (ref 9–12.9)
RBC # BLD AUTO: 3.92 M/UL (ref 4.2–5.4)
WBC # BLD AUTO: 9.4 K/UL (ref 4.8–10.8)

## 2019-06-19 PROCEDURE — 36415 COLL VENOUS BLD VENIPUNCTURE: CPT

## 2019-06-19 PROCEDURE — 700111 HCHG RX REV CODE 636 W/ 250 OVERRIDE (IP): Performed by: OBSTETRICS & GYNECOLOGY

## 2019-06-19 PROCEDURE — 303615 HCHG EPIDURAL/SPINAL ANESTHESIA FOR LABOR

## 2019-06-19 PROCEDURE — 0KQM0ZZ REPAIR PERINEUM MUSCLE, OPEN APPROACH: ICD-10-PCS | Performed by: OBSTETRICS & GYNECOLOGY

## 2019-06-19 PROCEDURE — 59409 OBSTETRICAL CARE: CPT

## 2019-06-19 PROCEDURE — 304965 HCHG RECOVERY SERVICES

## 2019-06-19 PROCEDURE — 700111 HCHG RX REV CODE 636 W/ 250 OVERRIDE (IP)

## 2019-06-19 PROCEDURE — A9270 NON-COVERED ITEM OR SERVICE: HCPCS | Performed by: OBSTETRICS & GYNECOLOGY

## 2019-06-19 PROCEDURE — 700111 HCHG RX REV CODE 636 W/ 250 OVERRIDE (IP): Performed by: ANESTHESIOLOGY

## 2019-06-19 PROCEDURE — 89060 EXAM SYNOVIAL FLUID CRYSTALS: CPT

## 2019-06-19 PROCEDURE — 85025 COMPLETE CBC W/AUTO DIFF WBC: CPT

## 2019-06-19 PROCEDURE — 770002 HCHG ROOM/CARE - OB PRIVATE (112)

## 2019-06-19 PROCEDURE — 700102 HCHG RX REV CODE 250 W/ 637 OVERRIDE(OP): Performed by: OBSTETRICS & GYNECOLOGY

## 2019-06-19 PROCEDURE — 700105 HCHG RX REV CODE 258: Performed by: OBSTETRICS & GYNECOLOGY

## 2019-06-19 PROCEDURE — 302790 HCHG STAT ANTEPARTUM CARE, DAILY

## 2019-06-19 RX ORDER — ONDANSETRON 2 MG/ML
4 INJECTION INTRAMUSCULAR; INTRAVENOUS EVERY 6 HOURS PRN
Status: DISCONTINUED | OUTPATIENT
Start: 2019-06-19 | End: 2019-06-21 | Stop reason: HOSPADM

## 2019-06-19 RX ORDER — MISOPROSTOL 200 UG/1
600 TABLET ORAL
Status: DISCONTINUED | OUTPATIENT
Start: 2019-06-19 | End: 2019-06-21 | Stop reason: HOSPADM

## 2019-06-19 RX ORDER — METHYLERGONOVINE MALEATE 0.2 MG/ML
0.2 INJECTION INTRAVENOUS
Status: DISCONTINUED | OUTPATIENT
Start: 2019-06-19 | End: 2019-06-21 | Stop reason: HOSPADM

## 2019-06-19 RX ORDER — MISOPROSTOL 200 UG/1
800 TABLET ORAL
Status: DISCONTINUED | OUTPATIENT
Start: 2019-06-19 | End: 2019-06-19 | Stop reason: HOSPADM

## 2019-06-19 RX ORDER — DEXTROSE, SODIUM CHLORIDE, SODIUM LACTATE, POTASSIUM CHLORIDE, AND CALCIUM CHLORIDE 5; .6; .31; .03; .02 G/100ML; G/100ML; G/100ML; G/100ML; G/100ML
INJECTION, SOLUTION INTRAVENOUS CONTINUOUS
Status: DISCONTINUED | OUTPATIENT
Start: 2019-06-19 | End: 2019-06-21 | Stop reason: HOSPADM

## 2019-06-19 RX ORDER — METHYLERGONOVINE MALEATE 0.2 MG/ML
0.2 INJECTION INTRAVENOUS
Status: DISCONTINUED | OUTPATIENT
Start: 2019-06-19 | End: 2019-06-19 | Stop reason: HOSPADM

## 2019-06-19 RX ORDER — BUPIVACAINE HYDROCHLORIDE 2.5 MG/ML
INJECTION, SOLUTION EPIDURAL; INFILTRATION; INTRACAUDAL
Status: COMPLETED
Start: 2019-06-19 | End: 2019-06-19

## 2019-06-19 RX ORDER — SODIUM CHLORIDE, SODIUM LACTATE, POTASSIUM CHLORIDE, CALCIUM CHLORIDE 600; 310; 30; 20 MG/100ML; MG/100ML; MG/100ML; MG/100ML
INJECTION, SOLUTION INTRAVENOUS CONTINUOUS
Status: DISPENSED | OUTPATIENT
Start: 2019-06-19 | End: 2019-06-19

## 2019-06-19 RX ORDER — CARBOPROST TROMETHAMINE 250 UG/ML
250 INJECTION, SOLUTION INTRAMUSCULAR
Status: DISCONTINUED | OUTPATIENT
Start: 2019-06-19 | End: 2019-06-19 | Stop reason: HOSPADM

## 2019-06-19 RX ORDER — CITRIC ACID/SODIUM CITRATE 334-500MG
30 SOLUTION, ORAL ORAL EVERY 6 HOURS PRN
Status: DISCONTINUED | OUTPATIENT
Start: 2019-06-19 | End: 2019-06-19 | Stop reason: HOSPADM

## 2019-06-19 RX ORDER — IBUPROFEN 600 MG/1
600 TABLET ORAL EVERY 6 HOURS PRN
Status: DISCONTINUED | OUTPATIENT
Start: 2019-06-19 | End: 2019-06-21 | Stop reason: HOSPADM

## 2019-06-19 RX ORDER — SODIUM CHLORIDE, SODIUM LACTATE, POTASSIUM CHLORIDE, CALCIUM CHLORIDE 600; 310; 30; 20 MG/100ML; MG/100ML; MG/100ML; MG/100ML
INJECTION, SOLUTION INTRAVENOUS PRN
Status: DISCONTINUED | OUTPATIENT
Start: 2019-06-19 | End: 2019-06-21 | Stop reason: HOSPADM

## 2019-06-19 RX ORDER — BUPIVACAINE HYDROCHLORIDE 2.5 MG/ML
INJECTION, SOLUTION EPIDURAL; INFILTRATION; INTRACAUDAL PRN
Status: DISCONTINUED | OUTPATIENT
Start: 2019-06-19 | End: 2019-06-19 | Stop reason: SURG

## 2019-06-19 RX ORDER — ROPIVACAINE HYDROCHLORIDE 2 MG/ML
INJECTION, SOLUTION EPIDURAL; INFILTRATION; PERINEURAL
Status: COMPLETED
Start: 2019-06-19 | End: 2019-06-19

## 2019-06-19 RX ORDER — OXYCODONE HYDROCHLORIDE AND ACETAMINOPHEN 5; 325 MG/1; MG/1
1 TABLET ORAL EVERY 4 HOURS PRN
Status: DISCONTINUED | OUTPATIENT
Start: 2019-06-19 | End: 2019-06-21 | Stop reason: HOSPADM

## 2019-06-19 RX ORDER — ONDANSETRON 4 MG/1
4 TABLET, ORALLY DISINTEGRATING ORAL EVERY 6 HOURS PRN
Status: DISCONTINUED | OUTPATIENT
Start: 2019-06-19 | End: 2019-06-21 | Stop reason: HOSPADM

## 2019-06-19 RX ORDER — OXYCODONE HYDROCHLORIDE AND ACETAMINOPHEN 5; 325 MG/1; MG/1
2 TABLET ORAL EVERY 4 HOURS PRN
Status: DISCONTINUED | OUTPATIENT
Start: 2019-06-19 | End: 2019-06-21 | Stop reason: HOSPADM

## 2019-06-19 RX ORDER — VITAMIN A ACETATE, BETA CAROTENE, ASCORBIC ACID, CHOLECALCIFEROL, .ALPHA.-TOCOPHEROL ACETATE, DL-, THIAMINE MONONITRATE, RIBOFLAVIN, NIACINAMIDE, PYRIDOXINE HYDROCHLORIDE, FOLIC ACID, CYANOCOBALAMIN, CALCIUM CARBONATE, FERROUS FUMARATE, ZINC OXIDE, CUPRIC OXIDE 3080; 12; 120; 400; 1; 1.84; 3; 20; 22; 920; 25; 200; 27; 10; 2 [IU]/1; UG/1; MG/1; [IU]/1; MG/1; MG/1; MG/1; MG/1; MG/1; [IU]/1; MG/1; MG/1; MG/1; MG/1; MG/1
1 TABLET, FILM COATED ORAL EVERY MORNING
Status: DISCONTINUED | OUTPATIENT
Start: 2019-06-20 | End: 2019-06-21 | Stop reason: HOSPADM

## 2019-06-19 RX ORDER — CARBOPROST TROMETHAMINE 250 UG/ML
250 INJECTION, SOLUTION INTRAMUSCULAR
Status: DISCONTINUED | OUTPATIENT
Start: 2019-06-19 | End: 2019-06-21 | Stop reason: HOSPADM

## 2019-06-19 RX ADMIN — SODIUM CHLORIDE 2.5 MILLION UNITS: 9 INJECTION, SOLUTION INTRAVENOUS at 18:48

## 2019-06-19 RX ADMIN — Medication 2000 ML/HR: at 21:20

## 2019-06-19 RX ADMIN — IBUPROFEN 600 MG: 600 TABLET ORAL at 22:00

## 2019-06-19 RX ADMIN — ROPIVACAINE HYDROCHLORIDE 100 ML: 2 INJECTION, SOLUTION EPIDURAL; INFILTRATION at 14:39

## 2019-06-19 RX ADMIN — SODIUM CHLORIDE, POTASSIUM CHLORIDE, SODIUM LACTATE AND CALCIUM CHLORIDE: 600; 310; 30; 20 INJECTION, SOLUTION INTRAVENOUS at 14:10

## 2019-06-19 RX ADMIN — FENTANYL CITRATE 100 MCG: 50 INJECTION INTRAMUSCULAR; INTRAVENOUS at 13:17

## 2019-06-19 RX ADMIN — OXYCODONE HYDROCHLORIDE AND ACETAMINOPHEN 2 TABLET: 5; 325 TABLET ORAL at 22:01

## 2019-06-19 RX ADMIN — Medication 125 ML/HR: at 22:01

## 2019-06-19 RX ADMIN — SODIUM CHLORIDE, POTASSIUM CHLORIDE, SODIUM LACTATE AND CALCIUM CHLORIDE: 600; 310; 30; 20 INJECTION, SOLUTION INTRAVENOUS at 10:55

## 2019-06-19 RX ADMIN — SODIUM CHLORIDE 5 MILLION UNITS: 900 INJECTION INTRAVENOUS at 10:55

## 2019-06-19 RX ADMIN — BUPIVACAINE HYDROCHLORIDE 5 ML: 2.5 INJECTION, SOLUTION EPIDURAL; INFILTRATION; INTRACAUDAL; PERINEURAL at 14:31

## 2019-06-19 RX ADMIN — SODIUM CHLORIDE, POTASSIUM CHLORIDE, SODIUM LACTATE AND CALCIUM CHLORIDE: 600; 310; 30; 20 INJECTION, SOLUTION INTRAVENOUS at 14:35

## 2019-06-19 RX ADMIN — SODIUM CHLORIDE 2.5 MILLION UNITS: 9 INJECTION, SOLUTION INTRAVENOUS at 14:57

## 2019-06-19 RX ADMIN — FENTANYL CITRATE 100 MCG: 50 INJECTION, SOLUTION INTRAMUSCULAR; INTRAVENOUS at 14:30

## 2019-06-19 ASSESSMENT — LIFESTYLE VARIABLES
ALCOHOL_USE: NO
EVER_SMOKED: NEVER

## 2019-06-19 ASSESSMENT — COPD QUESTIONNAIRES
IN THE PAST 12 MONTHS DO YOU DO LESS THAN YOU USED TO BECAUSE OF YOUR BREATHING PROBLEMS: DISAGREE/UNSURE
HAVE YOU SMOKED AT LEAST 100 CIGARETTES IN YOUR ENTIRE LIFE: NO/DON'T KNOW
DO YOU EVER COUGH UP ANY MUCUS OR PHLEGM?: NO/ONLY WITH OCCASIONAL COLDS OR INFECTIONS
DURING THE PAST 4 WEEKS HOW MUCH DID YOU FEEL SHORT OF BREATH: NONE/LITTLE OF THE TIME
COPD SCREENING SCORE: 0

## 2019-06-19 ASSESSMENT — EDINBURGH POSTNATAL DEPRESSION SCALE (EPDS)
THINGS HAVE BEEN GETTING ON TOP OF ME: NO, MOST OF THE TIME I HAVE COPED QUITE WELL
I HAVE FELT SAD OR MISERABLE: NO, NOT AT ALL
I HAVE BEEN SO UNHAPPY THAT I HAVE BEEN CRYING: NO, NEVER
I HAVE BEEN ANXIOUS OR WORRIED FOR NO GOOD REASON: HARDLY EVER
I HAVE LOOKED FORWARD WITH ENJOYMENT TO THINGS: AS MUCH AS I EVER DID
I HAVE BLAMED MYSELF UNNECESSARILY WHEN THINGS WENT WRONG: NOT VERY OFTEN
THE THOUGHT OF HARMING MYSELF HAS OCCURRED TO ME: NEVER
I HAVE FELT SCARED OR PANICKY FOR NO GOOD REASON: NO, NOT MUCH
I HAVE BEEN ABLE TO LAUGH AND SEE THE FUNNY SIDE OF THINGS: AS MUCH AS I ALWAYS COULD
I HAVE BEEN SO UNHAPPY THAT I HAVE HAD DIFFICULTY SLEEPING: NOT AT ALL

## 2019-06-19 ASSESSMENT — PATIENT HEALTH QUESTIONNAIRE - PHQ9
SUM OF ALL RESPONSES TO PHQ9 QUESTIONS 1 AND 2: 0
2. FEELING DOWN, DEPRESSED, IRRITABLE, OR HOPELESS: NOT AT ALL
1. LITTLE INTEREST OR PLEASURE IN DOING THINGS: NOT AT ALL

## 2019-06-19 ASSESSMENT — PAIN SCALES - GENERAL: PAIN_LEVEL: 1

## 2019-06-19 NOTE — H&P
History and Physical      Jo Ann Vazquez is a 23 y.o. female  at 37w5d who presents for ROM    Subjective:   positive  For CTXS.   negative Feels pain   positive for LOF  negative for vaginal bleeding.   positive for fetal movement    ROS: A comprehensive review of systems was negative.    Past Medical History:   Diagnosis Date   • Other psoriasis      Past Surgical History:   Procedure Laterality Date   • OTHER      none reported     Family History   Problem Relation Age of Onset   • Diabetes Maternal Grandfather    • Cancer Mother         ovarian   • Cancer Maternal Grandmother      OB History    Para Term  AB Living   1             SAB TAB Ectopic Molar Multiple Live Births                    # Outcome Date GA Lbr Jimmy/2nd Weight Sex Delivery Anes PTL Lv   1 Current                 Social History     Social History   • Marital status: Single     Spouse name: N/A   • Number of children: N/A   • Years of education: N/A     Occupational History   • Not on file.     Social History Main Topics   • Smoking status: Former Smoker     Packs/day: 0.15     Years: 2.00     Types: Cigarettes     Quit date: 2017   • Smokeless tobacco: Never Used   • Alcohol use No   • Drug use: No   • Sexual activity: Yes     Partners: Male     Birth control/ protection: Pill      Comment: Student/ Single     Other Topics Concern   • Not on file     Social History Narrative   • No narrative on file     Allergies: Patient has no known allergies.    Current Facility-Administered Medications:   •  LR infusion, , Intravenous, Continuous, Nohemi Westbrook M.D.  •  D5LR infusion, , Intravenous, Continuous, Nohemi Westbrook M.D.  •  fentaNYL (SUBLIMAZE) injection 50 mcg, 50 mcg, Intravenous, Q HOUR PRN, Nohemi Westbrook M.D.  •  fentaNYL (SUBLIMAZE) injection 100 mcg, 100 mcg, Intravenous, Q HOUR PRN, Nohemi Westbrook M.D.  •  Sod Citrate-Citric Acid (BICITRA) 500-334 MG/5ML solution 30 mL, 30 mL, Oral, Q6HRS PRN,  "Nohemi Westbrook M.D.  •  penicillin G potassium 5 Million Units in  mL IVPB, 5 Million Units, Intravenous, Once **FOLLOWED BY** penicillin G potassium 2.5 Million Units in  mL IVPB, 2.5 Million Units, Intravenous, Q4HRS, Nohemi Westbrook M.D.  •  oxytocin (PITOCIN) infusion (for induction), 0.5-20 randa-units/min, Intravenous, Continuous, Nohemi Westbrook M.D.  •  miSOPROStol (CYTOTEC) tablet 800 mcg, 800 mcg, Rectal, Once PRN, Nohemi Westbrook M.D.  •  methylergonovine (METHERGINE) injection 0.2 mg, 0.2 mg, Intramuscular, Once PRN, Nohemi Westbrook M.D.  •  carboPROST (HEMABATE) injection 250 mcg, 250 mcg, Intramuscular, Once PRN, Nohemi Westbrook M.D.    Prenatal care with Dr Betancur starting at 11 weeks with following problems:  Patient Active Problem List    Diagnosis Date Noted   • Tobacco use disorder 03/10/2017   • Healthcare maintenance 03/10/2017   • Other psoriasis 09/16/2009               Objective:      Temp 37.2 °C (99 °F) (Temporal)   Ht 1.626 m (5' 4\")   Wt 82.6 kg (182 lb)     General:   no acute distress   Skin:   normal   HEENT:  Neck supple with midline trachea   Lungs:   CTA bilateral   Heart:   S1, S2 normal, no murmur, click, rub or gallop, regular rate and rhythm, brisk carotid upstroke without bruits, peripheral pulses very brisk, chest is clear without rales or wheezing, no pedal edema, no JVD, no hepatosplenomegaly   Abdomen:   gravid, NT   EFW:  7 lbs   Pelvis:  adequate with gynecoid pelvis   FHT:  130 BPM   Uterine Size: S=D   Presentations: Cephalic   Cervix:     Dilation: 1cm per RN    Effacement: 50%    Station:  -2    Consistency: Medium    Position: Posterior     Lab Review  Lab:   Blood type: B -/ -  Hep B NR, RPR NR, Rub imm  GBS pos  HIV neg         Recent Results (from the past 5880 hour(s))   POCT Pregnancy    Collection Time: 10/20/18 10:56 AM   Result Value Ref Range    POC Urine Pregnancy Test NEG Negative    Internal Control Positive Valid     " Internal Control Negative Valid    PPD Skin Test    Collection Time: 01/30/19  4:49 PM   Result Value Ref Range    Ppd Skin Test 0.00    CBC WITH DIFFERENTIAL    Collection Time: 04/12/19  4:34 PM   Result Value Ref Range    WBC 16.3 (H) 4.8 - 10.8 K/uL    RBC 3.76 (L) 4.20 - 5.40 M/uL    Hemoglobin 12.2 12.0 - 16.0 g/dL    Hematocrit 36.3 (L) 37.0 - 47.0 %    MCV 96.5 81.4 - 97.8 fL    MCH 32.4 27.0 - 33.0 pg    MCHC 33.6 33.6 - 35.0 g/dL    RDW 41.1 35.9 - 50.0 fL    Platelet Count 164 164 - 446 K/uL    MPV 9.4 9.0 - 12.9 fL    Neutrophils-Polys 90.70 (H) 44.00 - 72.00 %    Lymphocytes 5.50 (L) 22.00 - 41.00 %    Monocytes 2.70 0.00 - 13.40 %    Eosinophils 0.10 0.00 - 6.90 %    Basophils 0.50 0.00 - 1.80 %    Immature Granulocytes 0.50 0.00 - 0.90 %    Nucleated RBC 0.00 /100 WBC    Neutrophils (Absolute) 14.77 (H) 2.00 - 7.15 K/uL    Lymphs (Absolute) 0.89 (L) 1.00 - 4.80 K/uL    Monos (Absolute) 0.44 0.00 - 0.85 K/uL    Eos (Absolute) 0.01 0.00 - 0.51 K/uL    Baso (Absolute) 0.08 0.00 - 0.12 K/uL    Immature Granulocytes (abs) 0.08 0.00 - 0.11 K/uL    NRBC (Absolute) 0.00 K/uL   URINE CULTURE(NEW)    Collection Time: 04/12/19  6:15 PM   Result Value Ref Range    Significant Indicator POS (POS)     Source UR     Site URINE, CLEAN CATCH     Urine Culture - (A)     Urine Culture Escherichia coli  ,000 cfu/mL   (A)        Susceptibility    Escherichia coli - ALEXIA     Ceftriaxone <=8 Sensitive mcg/mL     Ceftazidime <=1 Sensitive mcg/mL     Cephalothin 16 Intermediate mcg/mL     Cefotaxime <=2 Sensitive mcg/mL     Ciprofloxacin <=1 Sensitive mcg/mL     Cefepime <=8 Sensitive mcg/mL     Cefuroxime <=4 Sensitive mcg/mL     Ampicillin >16 Resistant mcg/mL     Cefotetan <=16 Sensitive mcg/mL     Tobramycin <=4 Sensitive mcg/mL     Nitrofurantoin <=32 Sensitive mcg/mL     Gentamicin <=4 Sensitive mcg/mL     Levofloxacin <=2 Sensitive mcg/mL     Pip/Tazobactam <=16 Sensitive mcg/mL     Piperacillin >64 Resistant  mcg/mL     Trimeth/Sulfa <=2/38 Sensitive mcg/mL     Tigecycline <=2 Sensitive mcg/mL        Assessment:   Jo Ann Vazquez at 37w5d  Labor status: SROM  Obstetrical history significant for   Patient Active Problem List    Diagnosis Date Noted   • Tobacco use disorder 03/10/2017   • Healthcare maintenance 03/10/2017   • Other psoriasis 09/16/2009   .      Plan:     Admit to L&D  GBS positive

## 2019-06-19 NOTE — PROGRESS NOTES
EDC - 19 EGA - 37.5    1000- Report received from MIGUELANGEL Paige RN, POC discussed with pt and family members, all questions answered.   1015 Admission procedures completed at this time.  1055 IV started, labs drawn.  1313 Dr. Westbrook at bedside SVE 2-3/80/-1.  1317 Fentanyl given see MAR.  1410 Pt requesting an epidural. Bolus started.  1432 - Dr. Calvo at bedside. Time out called at 1432. Epidural placed at this time by Dr Calvo. Test dose at 1439 - No reaction detected - VSS. Dermatome level of T8. Epidural infusion settings are : 10mL/hr continuous infusion, 5mL bolus every 15 minutes with a 25mL/hr dose limit.   1510 - Boss placed and draining to gravity. Turned to right side. Category 1 FHT tracing at this time. No complaints at this time.  1644 Dr Westbrook at bedside SVE 4/100/-1  194 Dr. Westbrook at bedside SVE lip/+1   SVE Complete/+2 Dr. Westbrook notified, orders to start pushing.  Pt repositioned into stirrups, pt educated on proper pushing technique. RN continuously at bedside evaluating FHT and pushing progress.   Dr. Westbrook at bedside for delivery.    of viable male infant by Dr. Westbrook to maternal abdomen. Apgars 8/9     Placenta delivered intact.     Fundus firm, lochia Light   Report given to WAYNE Meraz RN

## 2019-06-19 NOTE — ANESTHESIA PROCEDURE NOTES
Epidural Block  Performed by: MERI VILLAFUERTE  Authorized by: MERI VILLAFUERTE     Patient Location:  OB  Start Time:  6/19/2019 2:29 PM  End Time:  6/19/2019 2:33 PM  Reason for Block: labor analgesia    patient identified, IV checked, site marked, risks and benefits discussed, surgical consent, monitors and equipment checked, pre-op evaluation and timeout performed    Patient Position:  Sitting  Prep: ChloraPrep, patient draped and sterile technique    Monitoring:  Blood pressure, continuous pulse oximetry and heart rate  Approach:  Midline  Location:  L2-L3  Injection Technique:  TYLER saline  Skin infiltration:  Lidocaine  Strength:  1%  Dose:  3ml  Needle Type:  Tuohy  Needle Gauge:  17 G  Needle Length:  3.5 in  Loss of resistance::  6  Catheter Size:  19 G  Catheter at Skin Depth:  11  Test Dose:  Lidocaine 1.5% with epinephrine 1-to-200,000  Test Dose Result:  Negative

## 2019-06-19 NOTE — PROGRESS NOTES
"23 y.o.  EDC 19 EGA 37.5 here to L&D room 212 with Mother Kimberli c/o large gush of clear fluid at 0730 this morning with constant leaking since then. Contractions q 5 minutes rating 4/10. Expecting a baby boy Zander. FOB enroute, \"[place]\". Pt reports positive fetal movement, denies vaginal bleeding. Hx of UTI with hospitalization, taking Nitrofurantoin 50 mg last dose yesterday. Pt reports she is GBS positive.   EFM & [place] applied , moderate variability, accels present, no decels. Last ate dinner last night. [facility].   0955 SSE, pooling in vaginal vault. Nanci collected. SVE ///vertex/post.   "

## 2019-06-19 NOTE — ANESTHESIA PREPROCEDURE EVALUATION
Relevant Problems   No relevant active problems       Physical Exam    Airway   Mallampati: II  TM distance: >3 FB  Neck ROM: full       Cardiovascular - normal exam  Rhythm: regular  Rate: normal  (-) murmur     Dental - normal exam         Pulmonary - normal exam  Breath sounds clear to auscultation     Abdominal    Neurological - normal exam                 Anesthesia Plan    ASA 2       Plan - epidural   Neuraxial block will be labor analgesia        Induction: intravenous    Postoperative Plan: Postoperative administration of opioids is intended.    Pertinent diagnostic labs and testing reviewed    Informed Consent:    Anesthetic plan and risks discussed with patient.    Use of blood products discussed with: patient whom consented to blood products.

## 2019-06-19 NOTE — CARE PLAN
Problem: Pain  Goal: Alleviation of Pain or a reduction in pain to the patient's comfort goal  Outcome: PROGRESSING AS EXPECTED  Pain management options discussed    Problem: Risk for Infection, Impaired Wound Healing  Goal: Remain free from signs and symptoms of infection  Outcome: PROGRESSING AS EXPECTED  No s/s of infection noted, pt remains afebrile

## 2019-06-20 LAB
ERYTHROCYTE [DISTWIDTH] IN BLOOD BY AUTOMATED COUNT: 42 FL (ref 35.9–50)
HCT VFR BLD AUTO: 32.4 % (ref 37–47)
HGB BLD-MCNC: 10.9 G/DL (ref 12–16)
MCH RBC QN AUTO: 32 PG (ref 27–33)
MCHC RBC AUTO-ENTMCNC: 33.6 G/DL (ref 33.6–35)
MCV RBC AUTO: 95 FL (ref 81.4–97.8)
NUMBER OF RH DOSES IND 8505RD: NORMAL
PLATELET # BLD AUTO: 140 K/UL (ref 164–446)
PMV BLD AUTO: 10.8 FL (ref 9–12.9)
RBC # BLD AUTO: 3.41 M/UL (ref 4.2–5.4)
WBC # BLD AUTO: 13 K/UL (ref 4.8–10.8)

## 2019-06-20 PROCEDURE — 770002 HCHG ROOM/CARE - OB PRIVATE (112)

## 2019-06-20 PROCEDURE — 85027 COMPLETE CBC AUTOMATED: CPT

## 2019-06-20 PROCEDURE — 36415 COLL VENOUS BLD VENIPUNCTURE: CPT

## 2019-06-20 PROCEDURE — A9270 NON-COVERED ITEM OR SERVICE: HCPCS | Performed by: OBSTETRICS & GYNECOLOGY

## 2019-06-20 PROCEDURE — 700102 HCHG RX REV CODE 250 W/ 637 OVERRIDE(OP): Performed by: OBSTETRICS & GYNECOLOGY

## 2019-06-20 RX ORDER — IBUPROFEN 600 MG/1
600 TABLET ORAL EVERY 6 HOURS PRN
Qty: 30 TAB | Refills: 1 | Status: SHIPPED | OUTPATIENT
Start: 2019-06-20 | End: 2020-04-08

## 2019-06-20 RX ORDER — OXYCODONE HYDROCHLORIDE AND ACETAMINOPHEN 5; 325 MG/1; MG/1
1 TABLET ORAL EVERY 4 HOURS PRN
Qty: 15 TAB | Refills: 0 | Status: SHIPPED | OUTPATIENT
Start: 2019-06-20 | End: 2019-06-27

## 2019-06-20 RX ADMIN — Medication 1 TABLET: at 05:45

## 2019-06-20 RX ADMIN — IBUPROFEN 600 MG: 600 TABLET ORAL at 05:45

## 2019-06-20 RX ADMIN — IBUPROFEN 600 MG: 600 TABLET ORAL at 20:02

## 2019-06-20 RX ADMIN — OXYCODONE HYDROCHLORIDE AND ACETAMINOPHEN 1 TABLET: 5; 325 TABLET ORAL at 09:29

## 2019-06-20 RX ADMIN — IBUPROFEN 600 MG: 600 TABLET ORAL at 13:13

## 2019-06-20 RX ADMIN — OXYCODONE HYDROCHLORIDE AND ACETAMINOPHEN 1 TABLET: 5; 325 TABLET ORAL at 02:06

## 2019-06-20 NOTE — PROGRESS NOTES
2200- Report received from RUBEN Andersen RN . Pt resting comfortably in bed with infant skin to skin. All needs met at this time.   2320- Pt ambulatory to BR, able to void, j luis care completed, pad and brief applied. Pt and infant transported to Post partum unit via wheelchair in stable condition. Report given to Gina PUENTE

## 2019-06-20 NOTE — L&D DELIVERY NOTE
DATE OF SERVICE:  06/19/2019    PROCEDURE PERFORMED:  Normal spontaneous vaginal delivery with repair of   second-degree laceration.    PROCEDURE IN DETAIL:  This is a 23-year-old G1 who is an established patient   of Dr. Madeleine Betancur.  She was admitted at 37 weeks and 5 days gestation with   spontaneous rupture of membranes of clear fluid at her home.  She presented   to the hospital in latent labor.  Her cervix was 1 cm and she progressed   without any augmentation to complete-complete and a +2 station.  The patient   pushed until the head was delivered in an OA position over an intact perineum,   which obtained a second-degree laceration on the perineum.  The head was   delivered atraumatically.  The anterior and posterior shoulders were then   delivered without complication with the rest of the body to follow.  The   infant male with Apgars of 8 and 9 was placed on the maternal abdomen.  After   approximately 3 minutes, the cord was clamped x2 and cut by the father of the   baby.  The placenta delivered spontaneously, Doe intact with a 3-vessel   cord.  The laceration was repaired with 2-0 and 3-0 chromic in the standard   fashion.  The estimated blood loss was 200 mL.  The mother is to recover in   labor and delivery and the infant is to be transferred to the well-baby   nursery.       ____________________________________     MD RAMON Schneider / ALFONSO    DD:  06/19/2019 21:30:57  DT:  06/20/2019 01:50:56    D#:  [id number]  Job#:  [id number]

## 2019-06-20 NOTE — DISCHARGE SUMMARY
Discharge Summary:      Jo Ann Vazquez      Admit Date:   2019  Discharge Date:  2019     Admitting diagnosis:  Pregnancy  Labor and delivery, indication for care  Discharge Diagnosis: Status post vaginal, spontaneous.  Pregnancy Complications: none  Tubal Ligation:  no        History:  Past Medical History:   Diagnosis Date   • Other psoriasis      OB History    Para Term  AB Living   1 1 1     1   SAB TAB Ectopic Molar Multiple Live Births           0 1      # Outcome Date GA Lbr Jimmy/2nd Weight Sex Delivery Anes PTL Lv   1 Term 19 37w5d / 00:35 3.095 kg (6 lb 13.2 oz) M Vag-Spont EPI N MEETA           Patient has no known allergies.  Patient Active Problem List    Diagnosis Date Noted   • Tobacco use disorder 03/10/2017   • Healthcare maintenance 03/10/2017   • Other psoriasis 2009        Hospital Course:   23 y.o. , now para 1, was admitted with the above mentioned diagnosis, underwent Active Labor, vaginal, spontaneous. Patient postpartum course was unremarkable, with progressive advancement in diet , ambulation and toleration of oral analgesia. Patient without complaints today and desires discharge.      Vitals:    19 2200 19 2234 19 0002 19 0254   BP: 124/77 113/73 114/72 104/60   Pulse: 82 62 60 83   Resp:   17 17   Temp:   37.3 °C (99.1 °F) 36.8 °C (98.2 °F)   TempSrc:   Temporal Temporal   SpO2:   93% 94%   Weight:       Height:           Current Facility-Administered Medications   Medication Dose   • D5LR infusion     • ondansetron (ZOFRAN ODT) dispertab 4 mg  4 mg    Or   • ondansetron (ZOFRAN) syringe/vial injection 4 mg  4 mg   • oxytocin (PITOCIN) infusion (for induction)  0.5-20 randa-units/min   • oxytocin (PITOCIN) infusion (for postpartum)   mL/hr   • ibuprofen (MOTRIN) tablet 600 mg  600 mg   • oxyCODONE-acetaminophen (PERCOCET) 5-325 MG per tablet 1 Tab  1 Tab   • oxyCODONE-acetaminophen (PERCOCET) 5-325 MG per tablet  2 Tab  2 Tab   • LR infusion     • PRN oxytocin (PITOCIN) (20 Units/1000 mL) PRN for excessive uterine bleeding - See Admin Instr  125-999 mL/hr   • miSOPROStol (CYTOTEC) tablet 600 mcg  600 mcg   • methylergonovine (METHERGINE) injection 0.2 mg  0.2 mg   • carboPROST (HEMABATE) injection 250 mcg  250 mcg   • prenatal plus vitamin (STUARTNATAL 1+1) 27-1 MG tablet 1 Tab  1 Tab       Exam:  Breast Exam: Inspection negative. No nipple discharge or bleeding. No masses or nodularity palpable  Abdomen: Abdomen soft, non-tender. BS normal. No masses,  No organomegaly  Fundus Non Tender: yes  Incision: none  Perineum: perineum intact  Extremity: extremities, peripheral pulses and reflexes normal     Labs:  Recent Labs      06/19/19   1000  06/20/19   0537   WBC  9.4  13.0*   RBC  3.92*  3.41*   HEMOGLOBIN  12.9  10.9*   HEMATOCRIT  35.9*  32.4*   MCV  91.6  95.0   MCH  32.9  32.0   MCHC  35.9*  33.6   RDW  40.0  42.0   PLATELETCT  166  140*   MPV  10.6  10.8        Activity:   Discharge to home  Pelvic Rest x 6 weeks    Assessment:  normal postpartum course  Discharge Assessment: No areas of skin breakdown/redness; surgical incision intact/healing     Follow up: .Dr. Betancur 6 weeks     Discharge Meds:   Current Outpatient Prescriptions   Medication Sig Dispense Refill   • oxyCODONE-acetaminophen (PERCOCET) 5-325 MG Tab Take 1 Tab by mouth every four hours as needed for up to 7 days. 15 Tab 0   • ibuprofen (MOTRIN) 600 MG Tab Take 1 Tab by mouth every 6 hours as needed (For cramping after delivery; do not give if patient is receiving ketorolac (Toradol)). 30 Tab 1       Nohemi Westbrook M.D.

## 2019-06-20 NOTE — ANESTHESIA POSTPROCEDURE EVALUATION
Patient: Jo Ann Vazquez    Procedure Summary     Date:  06/19/19 Room / Location:      Anesthesia Start:  1429 Anesthesia Stop:  2111    Procedure:  Labor Epidural Diagnosis:      Scheduled Providers:   Responsible Provider:  Jose Calvo M.D.    Anesthesia Type:  epidural ASA Status:  2          Final Anesthesia Type: epidural  Last vitals  BP   Blood Pressure: 124/77    Temp   36.1 °C (97 °F)    Pulse   Pulse: 82   Resp        SpO2   100 %      Anesthesia Post Evaluation    Patient location during evaluation: PACU  Patient participation: complete - patient participated  Level of consciousness: awake and alert  Pain score: 1    Airway patency: patent  Anesthetic complications: no  Cardiovascular status: hemodynamically stable  Respiratory status: acceptable  Hydration status: euvolemic    PONV: none           Nurse Pain Score: 0 (NPRS)

## 2019-06-20 NOTE — CARE PLAN
Problem: Altered physiologic condition related to immediate post-delivery state and potential for bleeding/hemorrhage  Goal: Patient physiologically stable as evidenced by normal lochia, palpable uterine involution and vital signs within normal limits  Fundus firm, lochia light     Problem: Potential for postpartum infection related to presence of episiotomy/vaginal tear and/or uterine contamination  Goal: Patient will be absent from signs and symptoms of infection  No s/s of infection

## 2019-06-20 NOTE — CARE PLAN
Problem: Altered physiologic condition related to immediate post-delivery state and potential for bleeding/hemorrhage  Goal: Patient physiologically stable as evidenced by normal lochia, palpable uterine involution and vital signs within normal limits  Outcome: PROGRESSING AS EXPECTED  Patient has scant to light lochia with a firm palpable uterus.  Vital signs are within defined limits.  Assessment will continue.     Problem: Alteration in comfort related to episiotomy, vaginal repair and/or after birth pains  Goal: Patient verbalizes acceptable pain level  Outcome: PROGRESSING AS EXPECTED  Patient will ask for pain medication when needed.  Pain assessment will continue.

## 2019-06-20 NOTE — ANESTHESIA TIME REPORT
Anesthesia Start and Stop Event Times     Date Time Event    6/19/2019 1429 Anesthesia Start     2111 Anesthesia Stop        Responsible Staff  06/19/19    Name Role Begin End    Jose Calvo M.D. Anesth 1429 2111        Preop Diagnosis (Free Text):  Pre-op Diagnosis             Preop Diagnosis (Codes):    Post op Diagnosis  Pregnancy      Premium Reason  A. 3PM - 7AM    Comments: Premium [place]

## 2019-06-20 NOTE — ANESTHESIA QCDR
2019 [facility] Clinical Data Registry (for Quality Improvement)     Postoperative nausea/vomiting risk protocol (Adult = 18 yrs and Pediatric 3-17 yrs)- (430 and 463)  General inhalation anesthetic (NOT TIVA) with PONV risk factors: No  Provision of anti-emetic therapy with at least 2 different classes of agents: N/A  Patient DID NOT receive anti-emetic therapy and reason is documented in Medical Record: N/A    Multimodal Pain Management- (AQI59)  Patient undergoing Elective Surgery (i.e. Outpatient, or ASC, or Prescheduled Surgery prior to Hospital Admission): No  Use of Multimodal Pain Management, two or more drugs and/or interventions, NOT including systemic opioids: N/A  Exception: Documented allergy to multiple classes of analgesics: N/A    PACU assessment of acute postoperative pain prior to Anesthesia Care End- Applies to Patients Age = 18- (ABG7)  Initial PACU pain score is which of the following: < 7/10  Patient unable to report pain score: N/A    Post-anesthetic transfer of care checklist/protocol to PACU/ICU- (426 and 427)  Upon conclusion of case, patient transferred to which of the following locations: PACU/Non-ICU  Use of transfer checklist/protocol: Yes  Exclusion: Service Performed in Patient Hospital Room (and thus did not require transfer): N/A    PACU Reintubation- (AQI31)  General anesthesia requiring endotracheal intubation (ETT) along with subsequent extubation in OR or PACU: No  Required reintubation in the PACU: N/A  Extubation was a planned trial documented in the medical record prior to removal of the original airway device: N/A    Unplanned admission to ICU related to anesthesia service up through end of PACU care- (MD51)  Unplanned admission to ICU (not initially anticipated at anesthesia start time): No

## 2019-06-20 NOTE — LACTATION NOTE
MOB reports that infant latched @delivery, but has been very sleepy since. She was taught Hand expression and has been attempting latch every 203 hours while skin to skin, then HE to spoon feed back which she just did @10:45. New beginnings booklet given with lactation education review. Encouraged to continue and call for assist with latch as infant starts showing more interest. Infant is skin to skin now and grandma and FOB are present and supportive.

## 2019-06-20 NOTE — LACTATION NOTE
This note was copied from a baby's chart.  MOB reported infant latching and sustaining for 15 minutes @ approx 1400 with intermittent sucking.  Encouraged to continue calling for assist with latch and/or report a good latch for assessment by RN or lactation RN. MOB reports understanding.

## 2019-06-20 NOTE — PROGRESS NOTES
Patient arrived to [facility] via wc with infant, SO, and belongings. Discussed POC and oriented patient to call light, emergency cords, unit policies, and educational videos. All questions answered.

## 2019-06-21 VITALS
SYSTOLIC BLOOD PRESSURE: 104 MMHG | OXYGEN SATURATION: 95 % | BODY MASS INDEX: 31.07 KG/M2 | HEIGHT: 64 IN | WEIGHT: 182 LBS | RESPIRATION RATE: 18 BRPM | DIASTOLIC BLOOD PRESSURE: 57 MMHG | HEART RATE: 63 BPM | TEMPERATURE: 98.3 F

## 2019-06-21 PROCEDURE — A9270 NON-COVERED ITEM OR SERVICE: HCPCS | Performed by: OBSTETRICS & GYNECOLOGY

## 2019-06-21 PROCEDURE — 700102 HCHG RX REV CODE 250 W/ 637 OVERRIDE(OP): Performed by: OBSTETRICS & GYNECOLOGY

## 2019-06-21 RX ADMIN — IBUPROFEN 600 MG: 600 TABLET ORAL at 03:21

## 2019-06-21 RX ADMIN — IBUPROFEN 600 MG: 600 TABLET ORAL at 15:39

## 2019-06-21 RX ADMIN — OXYCODONE HYDROCHLORIDE AND ACETAMINOPHEN 1 TABLET: 5; 325 TABLET ORAL at 03:21

## 2019-06-21 RX ADMIN — Medication 1 TABLET: at 06:00

## 2019-06-21 RX ADMIN — OXYCODONE HYDROCHLORIDE AND ACETAMINOPHEN 1 TABLET: 5; 325 TABLET ORAL at 15:40

## 2019-06-21 RX ADMIN — OXYCODONE HYDROCHLORIDE AND ACETAMINOPHEN 1 TABLET: 5; 325 TABLET ORAL at 07:55

## 2019-06-21 RX ADMIN — IBUPROFEN 600 MG: 600 TABLET ORAL at 10:02

## 2019-06-21 NOTE — DISCHARGE INSTRUCTIONS
POSTPARTUM DISCHARGE INSTRUCTIONS FOR MOM    YOB: 1995   Age: 23 y.o.               Admit Date: 2019     Discharge Date: 2019  Attending Doctor:  Madeleine Betancur M.D.                  Allergies:  Patient has no known allergies.    Discharged to home by car. Discharged via wheelchair, hospital escort: Yes.  Special equipment needed: Not Applicable  Belongings with: Personal  Be sure to schedule a follow-up appointment with your primary care doctor or any specialists as instructed.     Discharge Plan:   Influenza Vaccine Indication: Not indicated: Previously immunized this influenza season and > 8 years of age    REASONS TO CALL YOUR OBSTETRICIAN:  1.   Persistent fever or shaking chills (Temperature higher than 100.4)  2.   Heavy bleeding (soaking more than 1 pad per hour); Passing clots  3.   Foul odor from vagina  4.   Mastitis (Breast infection; breast pain, chills, fever, redness)  5.   Urinary pain, burning or frequency  6.   Episiotomy infection  7.   Abdominal incision infection  8.   Severe depression longer than 24 hours    HAND WASHING  · Prior to handling the baby.  · Before breastfeeding or bottle feeding baby.  · After using the bathroom or changing the baby's diaper.    WOUND CARE  Ask your physician for additional care instructions.  In general:    ·  Incision:      · Keep clean and dry.    · Do NOT lift anything heavier than your baby for up to 6 weeks.    · There should not be any opening or pus.      VAGINAL CARE  · Nothing inside vagina for 6 weeks: no sexual intercourse, tampons or douching.  · Bleeding may continue for 2-4 weeks.  Amount may vary.    · Call your physician for heavy bleeding which means soaking more than 1 pad per hour    BIRTH CONTROL  · It is possible to become pregnant at any time after delivery and while breastfeeding.  · Plan to discuss a method of birth control with your physician at your follow up visit. visit.    DIET AND  "ELIMINATION  · Eating more fiber (bran cereal, fruits, and vegetables) and drinking plenty of fluids will help to avoid constipation.  · Urinary frequency after childbirth is normal.    POSTPARTUM BLUES  During the first few days after birth, you may experience a sense of the \"blues\" which may include impatience, irritability or even crying.  These feeling come and go quickly.  However, as many as 1 in 10 women experience emotional symptoms known as postpartum depression.    Postpartum depression:  May start as early as the second or third day after delivery or take several weeks or months to develop.  Symptoms of \"blues\" are present, but are more intense:  Crying spells; loss of appetite; feelings of hopelessness or loss of control; fear of touching the baby; over concern or no concern at all about the baby; little or no concern about your own appearance/caring for yourself; and/or inability to sleep or excessive sleeping.  Contact your physician if you are experiencing any of these symptoms.    Crisis Hotline:  · [place] Crisis Hotline:  8-125-WNEFHPD  Or 1-384.104.5576  · [place] Crisis Hotline:  1-129.236.4595  Or 115-276-5881    PREVENTING SHAKEN BABY:  If you are angry or stressed, PUT THE BABY IN THE CRIB, step away, take some deep breaths, and wait until you are calm to care for the baby.  DO NOT SHAKE THE BABY.  You are not alone, call a supporter for help.    · Crisis Call Center 24/7 crisis line 860-027-7413 or 1-619.739.5592  · You can also text them, text \"ANSWER\" to 283527    QUIT SMOKING/TOBACCO USE:  I understand the use of any tobacco products increases my chance of suffering from future heart disease and could cause other illnesses which may shorten my life. Quitting the use of tobacco products is the single most important thing I can do to improve my health. For further information on smoking / tobacco cessation call a Toll Free Quit Line at 1-949.557.6531 (*National Cancer [place]) or " 1-924.884.5520 (American Lung Association) or you can access the web based program at www.lungusa.org.    · [place] Tobacco Users Help Line:  (677) 168-9073       Toll Free: 1-341.980.2055  · Quit Tobacco Program [facility] (841)073-7791    DEPRESSION / SUICIDE RISK:  As you are discharged from this [facility], it is important to learn how to keep safe from harming yourself.    Recognize the warning signs:  · Abrupt changes in personality, positive or negative- including increase in energy   · Giving away possessions  · Change in eating patterns- significant weight changes-  positive or negative  · Change in sleeping patterns- unable to sleep or sleeping all the time   · Unwillingness or inability to communicate  · Depression  · Unusual sadness, discouragement and loneliness  · Talk of wanting to die  · Neglect of personal appearance   · Rebelliousness- reckless behavior  · Withdrawal from people/activities they love  · Confusion- inability to concentrate     If you or a loved one observes any of these behaviors or has concerns about self-harm, here's what you can do:  · Talk about it- your feelings and reasons for harming yourself  · Remove any means that you might use to hurt yourself (examples: pills, rope, extension cords, firearm)  · Get professional help from the community (Mental Health, Substance Abuse, psychological counseling)  · Do not be alone:Call your Safe Contact- someone whom you trust who will be there for you.  · Call your local CRISIS HOTLINE 298-2091 or 522-616-7698  · Call your local Children's Mobile Crisis Response Team [place] (642) 263-3634 or www.[employer]  · Call the toll free National Suicide Prevention Hotlines   · National Suicide Prevention Lifeline 676-101-RFYW (2189)  · National Hope Line Network 800-SUICIDE (528-3294)    DISCHARGE SURVEY:  Thank you for choosing [facility].  We hope we provided you with very good care.  You may be  receiving a survey in the mail.  Please fill it out.  Your opinion is valuable to us.    ADDITIONAL EDUCATIONAL MATERIALS GIVEN TO PATIENT:        My signature on this form indicates that:  1.  I have reviewed and understand the above information  2.  My questions regarding this information have been answered to my satisfaction.  3.  I have formulated a plan with my discharge nurse to obtain my prescribed medication for home.

## 2019-06-21 NOTE — PROGRESS NOTES
S:  No c/o, decreased vaginal bleeding, no s/s infection, still working on breast feeding    O:  VSS, AF  PE:  Gen:  NAD.  Abd: soft, NT/ND, no peritoneal signs, FF and NT.  Ext=  No s/s DVT    A:  S/p  PPD#2 - discharge held yesterday for baby    P:  D/c home today with precautions.  F/u ER with temp>100 degrees, severe pain, nausea/vomiting, vaginal bleeding greater than a period, any concerns.  No driving on narcotic pain meds, pelvic rest for 6 weeks.  F/u appointment for post partum check with Dr. Betancur in 6 weeks.    See discharge summary done 19

## 2019-06-21 NOTE — LACTATION NOTE
MOB having difficulties with latching, grandmother is spoonfeeding colostrum when LC walked in.   Attempted to assist with latch to left breast in football hold. Noted infant to be tongue sucking. Showed MOB how to do suck training with clean finger. After some suck training, attempted to latch on. Infant able to suckle in 2 bursts, but would not sustain latch. After a few more attempts, infant fell asleep. Left infant in MOB's arms, and encouraged her to feedback expressed colostrum. Discussed trying nipple shield for next feeding.     0030-   Assisted with BF attempt, tried 24 mm nipple shield (NS) Provided, both verbal and written NS education provided, a physical demonstration of how to apply NS provided, extensive education provided on importance of continuing to pump if using a NS, parents also given extensive education on need to supplement with pumped milk and/or formula if using a NS.   Plan is to attempt to BF Q 3 hours, for no/suboptimal feeding mother is to pump for 10-15 minutes and supplement.    After several attempts to get infant to latch onto right breast in football hold, infant was asleep at breast again, and did not open mouth for latching with NS.       Discussed with mother nipple shield is a tool and may only need to be used for a short time (encouraged follow-up when using shield). Mother encouraged follow up and schedule outpatient appointment with [facility] or attend Breastfeeding circles.      Encouraged to call for assistance as needed.

## 2019-06-21 NOTE — PROGRESS NOTES
Assessment complete. Discussed POC, and answered all questions. Instructed patient on electric breast pump. Encouraged patient to continue to attempt breast feeding every 3 hours, pump and feed pumped milk/donor milk to infant. All questions answered.

## 2019-06-26 NOTE — ADDENDUM NOTE
Encounter addended by: Janay Alamo R.N. on: 6/26/2019 12:10 PM<BR>    Actions taken: Flowsheet accepted

## 2019-11-11 ENCOUNTER — NON-PROVIDER VISIT (OUTPATIENT)
Dept: MEDICAL GROUP | Facility: PHYSICIAN GROUP | Age: 24
End: 2019-11-11
Payer: COMMERCIAL

## 2019-11-11 DIAGNOSIS — Z23 NEED FOR VACCINATION: ICD-10-CM

## 2019-11-11 PROCEDURE — 90686 IIV4 VACC NO PRSV 0.5 ML IM: CPT | Performed by: NURSE PRACTITIONER

## 2019-11-11 PROCEDURE — 90471 IMMUNIZATION ADMIN: CPT | Performed by: NURSE PRACTITIONER

## 2019-12-31 ENCOUNTER — HOSPITAL ENCOUNTER (OUTPATIENT)
Facility: MEDICAL CENTER | Age: 24
End: 2019-12-31
Attending: NURSE PRACTITIONER
Payer: COMMERCIAL

## 2019-12-31 ENCOUNTER — OFFICE VISIT (OUTPATIENT)
Dept: MEDICAL GROUP | Facility: PHYSICIAN GROUP | Age: 24
End: 2019-12-31
Payer: COMMERCIAL

## 2019-12-31 ENCOUNTER — PATIENT MESSAGE (OUTPATIENT)
Dept: MEDICAL GROUP | Facility: PHYSICIAN GROUP | Age: 24
End: 2019-12-31

## 2019-12-31 VITALS
SYSTOLIC BLOOD PRESSURE: 110 MMHG | DIASTOLIC BLOOD PRESSURE: 76 MMHG | HEART RATE: 123 BPM | BODY MASS INDEX: 25.1 KG/M2 | RESPIRATION RATE: 14 BRPM | HEIGHT: 64 IN | OXYGEN SATURATION: 94 % | WEIGHT: 147 LBS | TEMPERATURE: 98.6 F

## 2019-12-31 DIAGNOSIS — R30.0 DYSURIA: ICD-10-CM

## 2019-12-31 LAB
APPEARANCE UR: CLEAR
BILIRUB UR STRIP-MCNC: NORMAL MG/DL
COLOR UR AUTO: YELLOW
GLUCOSE UR STRIP.AUTO-MCNC: NORMAL MG/DL
KETONES UR STRIP.AUTO-MCNC: NORMAL MG/DL
LEUKOCYTE ESTERASE UR QL STRIP.AUTO: NORMAL
NITRITE UR QL STRIP.AUTO: NORMAL
PH UR STRIP.AUTO: 7 [PH] (ref 5–8)
PROT UR QL STRIP: NORMAL MG/DL
RBC UR QL AUTO: NORMAL
SP GR UR STRIP.AUTO: 1.01
UROBILINOGEN UR STRIP-MCNC: 0.2 MG/DL

## 2019-12-31 PROCEDURE — 81002 URINALYSIS NONAUTO W/O SCOPE: CPT | Performed by: NURSE PRACTITIONER

## 2019-12-31 PROCEDURE — 99214 OFFICE O/P EST MOD 30 MIN: CPT | Performed by: NURSE PRACTITIONER

## 2019-12-31 PROCEDURE — 87086 URINE CULTURE/COLONY COUNT: CPT

## 2019-12-31 RX ORDER — NITROFURANTOIN 25; 75 MG/1; MG/1
100 CAPSULE ORAL 2 TIMES DAILY
Qty: 14 CAP | Refills: 0 | Status: SHIPPED
Start: 2019-12-31 | End: 2020-04-08

## 2019-12-31 NOTE — TELEPHONE ENCOUNTER
From: Jo Ann Vazquez  To: BERNIE Navarro  Sent: 12/31/2019 1:15 PM PST  Subject: Prescription Question    I will take it!

## 2019-12-31 NOTE — TELEPHONE ENCOUNTER
From: Jo Ann Vazquez  To: BERNIE Navarro  Sent: 12/31/2019 7:01 AM PST  Subject: Prescription Question    Hello Dr. Schilling, I have a uti and have had it for almost two weeks now. was wondering if I could get prescribed some antibiotics sent to [facility] so I don't have to run into urgent care. Especially since I already know what I have. Thank you!

## 2020-01-01 NOTE — PROGRESS NOTES
Chief Complaint   Patient presents with   • Painful Urination     x 2 weeks    • UTI         This is a 24 y.o.female patient that presents today with the following: Dysuria    No problem-specific Assessment & Plan notes found for this encounter.      No visits with results within 1 Month(s) from this visit.   Latest known visit with results is:   Admission on 06/19/2019, Discharged on 06/21/2019   Component Date Value   • Fern Test On Amniotic Fl* 06/19/2019 see below    • Holding Tube - Bb 06/19/2019 DONE    • WBC 06/19/2019 9.4    • RBC 06/19/2019 3.92*   • Hemoglobin 06/19/2019 12.9    • Hematocrit 06/19/2019 35.9*   • MCV 06/19/2019 91.6    • MCH 06/19/2019 32.9    • MCHC 06/19/2019 35.9*   • RDW 06/19/2019 40.0    • Platelet Count 06/19/2019 166    • MPV 06/19/2019 10.6    • Neutrophils-Polys 06/19/2019 78.40*   • Lymphocytes 06/19/2019 16.50*   • Monocytes 06/19/2019 3.80    • Eosinophils 06/19/2019 0.70    • Basophils 06/19/2019 0.40    • Immature Granulocytes 06/19/2019 0.20    • Nucleated RBC 06/19/2019 0.00    • Neutrophils (Absolute) 06/19/2019 7.33*   • Lymphs (Absolute) 06/19/2019 1.54    • Monos (Absolute) 06/19/2019 0.36    • Eos (Absolute) 06/19/2019 0.07    • Baso (Absolute) 06/19/2019 0.04    • Immature Granulocytes (a* 06/19/2019 0.02    • NRBC (Absolute) 06/19/2019 0.00    • WBC 06/20/2019 13.0*   • RBC 06/20/2019 3.41*   • Hemoglobin 06/20/2019 10.9*   • Hematocrit 06/20/2019 32.4*   • MCV 06/20/2019 95.0    • MCH 06/20/2019 32.0    • MCHC 06/20/2019 33.6    • RDW 06/20/2019 42.0    • Platelet Count 06/20/2019 140*   • MPV 06/20/2019 10.8    • Number Of Rh Doses Indic* 06/20/2019 ZERO          clinical course has been stable    Past Medical History:   Diagnosis Date   • Other psoriasis        Past Surgical History:   Procedure Laterality Date   • OTHER      none reported       Family History   Problem Relation Age of Onset   • Diabetes Maternal Grandfather    • Cancer Mother         ovarian   •  "Cancer Maternal Grandmother        Patient has no known allergies.    Current Outpatient Medications Ordered in Epic   Medication Sig Dispense Refill   • nitrofurantoin monohyd macro (MACROBID) 100 MG Cap Take 1 Cap by mouth 2 times a day. 14 Cap 0   • ibuprofen (MOTRIN) 600 MG Tab Take 1 Tab by mouth every 6 hours as needed (For cramping after delivery; do not give if patient is receiving ketorolac (Toradol)). 30 Tab 1   • Prenatal MV-Min-Fe Fum-FA-DHA (PRENATAL 1 PO) Take  by mouth.     • nitrofurantoin (MACRODANTIN) 50 MG Cap Take 2 Caps by mouth every day. 60 Cap 3     No current Epic-ordered facility-administered medications on file.        Constitutional ROS: No unexpected change in weight, No weakness, No unexplained fevers, sweats, or chills  Pulmonary ROS: No chronic cough, sputum, or hemoptysis, No shortness of breath, No recent change in breathing  Cardiovascular ROS: No chest pain  Musculoskeletal/Extremities ROS: No clubbing  Neurologic ROS: Normal development, No seizures, No weakness   ROS: Positive per HPI    Physical exam:  /76   Pulse (!) 123   Temp 37 °C (98.6 °F) (Temporal)   Resp 14   Ht 1.626 m (5' 4\")   Wt 66.7 kg (147 lb)   SpO2 94%   BMI 25.23 kg/m²   General Appearance: Very pleasant young female, alert, no distress, well-nourished, well-groomed  Skin: Skin color, texture, turgor normal. No rashes or lesions.  Lungs: negative findings: normal respiratory rate and rhythm, normal effort  Musculoskeletal: negative findings: no evidence of joint instability, no evidence of muscle atrophy, no deformities present  Neurologic: intact, CN II through XII grossly intact    Medical decision making/discussion: We will go ahead and treat for presumed UTI, will send urine off for culture, she will notify results and any further actions if needed.    Jo Ann Spencer was seen today for painful urination and uti.    Diagnoses and all orders for this visit:    Dysuria  -     nitrofurantoin monohyd " "macro (MACROBID) 100 MG Cap; Take 1 Cap by mouth 2 times a day.  -     POCT Urinalysis  -     URINE CULTURE(NEW); Future        No follow-ups on file.        Please note that this dictation was created using voice recognition software. I have made every reasonable attempt to correct obvious errors, but I expect that there are errors of grammar and possibly content that I did not discover before finalizing the note.        Chief Complaint   Patient presents with   • Painful Urination     x 2 weeks    • UTI       HPI:  Symptom onset: 2 weeks ago days ago   Current symptoms: Painful, urgent, frequent voids. No blood noted in urine.  Since onset symptoms are: Unchanged  Treatments tried: OTC antispasm med.  Associated symptoms: Negative for fever, flank pain, nausea and vomiting, vaginal discharge, pelvic pain.  History is positive for frequent UTI.     ROS:  Denies fever, chills, vomiting or abdominal pain.     OBJECTIVE:  /76   Pulse (!) 123   Temp 37 °C (98.6 °F) (Temporal)   Resp 14   Ht 1.626 m (5' 4\")   Wt 66.7 kg (147 lb)   SpO2 94%   Gen: Alert, NAD.  Chest: Lungs clear to auscultation, CV RRR.  Abdomen: Soft, tender in suprapubic region. No CVAT. Normal bowel sounds.     Lab Results   Component Value Date    POCCOLOR yellow 12/31/2019    POCAPPEAR clear 12/31/2019    POCLEUKEST small 12/31/2019    POCNITRITE neg 12/31/2019    POCUROBILIGE 0.2 12/31/2019    POCPROTEIN neg 12/31/2019    POCURPH 7.0 12/31/2019    POCBLOOD neg 12/31/2019    POCSPGRV 1.015 12/31/2019    POCKETONES neg 12/31/2019    POCBILIRUBIN neg 12/31/2019    POCGLUCUA neg 12/31/2019          ASSESSMENT/PLAN:     1. Dysuria          1. Abnormal urine dipstick in office. Urine sent for culture. Start antibiotics.  2. Provided education to drink plenty of fluids, wipe front to back every void and bowel movement.   3. Return to clinic if symptoms not improving within 3-4 days or in case of vomiting, fever, increasing pain.  "

## 2020-01-02 DIAGNOSIS — R30.0 DYSURIA: ICD-10-CM

## 2020-01-04 LAB
BACTERIA UR CULT: NORMAL
SIGNIFICANT IND 70042: NORMAL
SITE SITE: NORMAL
SOURCE SOURCE: NORMAL

## 2020-02-03 ENCOUNTER — OFFICE VISIT (OUTPATIENT)
Dept: URGENT CARE | Facility: PHYSICIAN GROUP | Age: 25
End: 2020-02-03
Payer: COMMERCIAL

## 2020-02-03 ENCOUNTER — HOSPITAL ENCOUNTER (OUTPATIENT)
Facility: MEDICAL CENTER | Age: 25
End: 2020-02-03
Attending: PHYSICIAN ASSISTANT
Payer: COMMERCIAL

## 2020-02-03 VITALS
HEIGHT: 64 IN | HEART RATE: 99 BPM | DIASTOLIC BLOOD PRESSURE: 70 MMHG | BODY MASS INDEX: 25.27 KG/M2 | SYSTOLIC BLOOD PRESSURE: 102 MMHG | RESPIRATION RATE: 16 BRPM | WEIGHT: 148 LBS | TEMPERATURE: 97.4 F | OXYGEN SATURATION: 99 %

## 2020-02-03 DIAGNOSIS — R30.0 DYSURIA: ICD-10-CM

## 2020-02-03 DIAGNOSIS — R35.0 URINARY FREQUENCY: ICD-10-CM

## 2020-02-03 DIAGNOSIS — R39.15 URINARY URGENCY: ICD-10-CM

## 2020-02-03 LAB
APPEARANCE UR: NORMAL
BILIRUB UR STRIP-MCNC: NORMAL MG/DL
COLOR UR AUTO: YELLOW
GLUCOSE UR STRIP.AUTO-MCNC: NORMAL MG/DL
INT CON NEG: NORMAL
INT CON POS: NORMAL
KETONES UR STRIP.AUTO-MCNC: NORMAL MG/DL
LEUKOCYTE ESTERASE UR QL STRIP.AUTO: NORMAL
NITRITE UR QL STRIP.AUTO: NORMAL
PH UR STRIP.AUTO: 6.5 [PH] (ref 5–8)
POC URINE PREGNANCY TEST: NORMAL
PROT UR QL STRIP: NORMAL MG/DL
RBC UR QL AUTO: NORMAL
SP GR UR STRIP.AUTO: 1.01
UROBILINOGEN UR STRIP-MCNC: 0.2 MG/DL

## 2020-02-03 PROCEDURE — 87077 CULTURE AEROBIC IDENTIFY: CPT

## 2020-02-03 PROCEDURE — 81002 URINALYSIS NONAUTO W/O SCOPE: CPT | Performed by: PHYSICIAN ASSISTANT

## 2020-02-03 PROCEDURE — 81025 URINE PREGNANCY TEST: CPT | Performed by: PHYSICIAN ASSISTANT

## 2020-02-03 PROCEDURE — 99214 OFFICE O/P EST MOD 30 MIN: CPT | Performed by: PHYSICIAN ASSISTANT

## 2020-02-03 PROCEDURE — 87086 URINE CULTURE/COLONY COUNT: CPT

## 2020-02-03 PROCEDURE — 87186 SC STD MICRODIL/AGAR DIL: CPT

## 2020-02-03 RX ORDER — CEFDINIR 300 MG/1
300 CAPSULE ORAL EVERY 12 HOURS
Qty: 10 CAP | Refills: 0 | Status: SHIPPED | OUTPATIENT
Start: 2020-02-03 | End: 2020-02-08

## 2020-02-03 NOTE — PROGRESS NOTES
Chief Complaint   Patient presents with   • Dysuria     x 1 week   • Flank Pain       HISTORY OF PRESENT ILLNESS: Patient is a 24 y.o. female who presents today for the following:    Dysuria x 1 week  Right flank pain x 2-3 days  LMP 2-3 weeks ago  H/o UTI requiring hospitalization  Denies fever, N/V    Patient Active Problem List    Diagnosis Date Noted   • Postpartum care following vaginal delivery 2019   • Tobacco use disorder 03/10/2017   • Healthcare maintenance 03/10/2017   • Other psoriasis 2009       Allergies:Patient has no known allergies.    Current Outpatient Medications Ordered in Epic   Medication Sig Dispense Refill   • cefdinir (OMNICEF) 300 MG Cap Take 1 Cap by mouth every 12 hours for 5 days. 10 Cap 0   • nitrofurantoin monohyd macro (MACROBID) 100 MG Cap Take 1 Cap by mouth 2 times a day. 14 Cap 0   • ibuprofen (MOTRIN) 600 MG Tab Take 1 Tab by mouth every 6 hours as needed (For cramping after delivery; do not give if patient is receiving ketorolac (Toradol)). 30 Tab 1   • Prenatal MV-Min-Fe Fum-FA-DHA (PRENATAL 1 PO) Take  by mouth.     • nitrofurantoin (MACRODANTIN) 50 MG Cap Take 2 Caps by mouth every day. 60 Cap 3     No current Epic-ordered facility-administered medications on file.        Past Medical History:   Diagnosis Date   • Other psoriasis        Social History     Tobacco Use   • Smoking status: Former Smoker     Packs/day: 0.15     Years: 2.00     Pack years: 0.30     Types: Cigarettes     Last attempt to quit: 2017     Years since quittin.6   • Smokeless tobacco: Never Used   Substance Use Topics   • Alcohol use: No     Alcohol/week: 0.0 oz   • Drug use: No       Family Status   Relation Name Status   • Fa  Other        dad's side is unknown   • MGFa  Alive   • Mo  Alive   • MGMo  (Not Specified)     Family History   Problem Relation Age of Onset   • Diabetes Maternal Grandfather    • Cancer Mother         ovarian   • Cancer Maternal Grandmother        Review of  "Systems:   Constitutional ROS: No unexpected change in weight, No weakness, No fatigue  Pulmonary ROS: No chronic cough, sputum, or hemoptysis, No dyspnea on exertion, No wheezing  Cardiovascular ROS: No diaphoresis, No edema, No palpitations  Gastrointestinal ROS: No change in bowel habits, No significant change in appetite, No nausea, vomiting, diarrhea, or constipation  Hematologic/Lymphatic ROS: No chills, No night sweats, No weight loss  Skin/Integumentary ROS: No edema, No evidence of rash, No itching      Exam:  /70   Pulse 99   Temp 36.3 °C (97.4 °F) (Temporal)   Resp 16   Ht 1.626 m (5' 4\")   Wt 67.1 kg (148 lb)   SpO2 99%   General: Well developed, well nourished. No distress.  Pulmonary: Unlabored respiratory effort.   Back: No CVA tenderness noted.  Neurologic: Grossly nonfocal. No facial asymmetry noted.  Skin: Warm, dry, good turgor. No rashes in visible areas.   Psych: Normal mood. Alert and oriented x3. Judgment and insight is normal.    UA: Large leukocyte esterase, otherwise negative  HCG: Negative    Assessment/Plan:  Drink plenty of fluids. Will contact patient with culture results. Use all medication as directed. Follow up for worsening or persistent symptoms.  1. Dysuria  POCT Urinalysis    Urine Culture    POCT PREGNANCY    cefdinir (OMNICEF) 300 MG Cap   2. Urinary frequency  POCT Urinalysis    Urine Culture    POCT PREGNANCY    cefdinir (OMNICEF) 300 MG Cap   3. Urinary urgency  POCT Urinalysis    Urine Culture    POCT PREGNANCY    cefdinir (OMNICEF) 300 MG Cap       "

## 2020-02-05 ENCOUNTER — TELEPHONE (OUTPATIENT)
Dept: URGENT CARE | Facility: PHYSICIAN GROUP | Age: 25
End: 2020-02-05

## 2020-02-05 NOTE — TELEPHONE ENCOUNTER
----- Message from Gisele Castaneda P.A.-C. sent at 2/5/2020  9:20 AM PST -----  Please let pt know the urine culture is positive for infection. Patient was treated appropriately during the visit. Follow up for persistent symptoms.

## 2020-04-08 ENCOUNTER — OFFICE VISIT (OUTPATIENT)
Dept: MEDICAL GROUP | Facility: PHYSICIAN GROUP | Age: 25
End: 2020-04-08
Payer: COMMERCIAL

## 2020-04-08 VITALS
TEMPERATURE: 98.6 F | BODY MASS INDEX: 25.23 KG/M2 | WEIGHT: 147 LBS | DIASTOLIC BLOOD PRESSURE: 67 MMHG | SYSTOLIC BLOOD PRESSURE: 105 MMHG

## 2020-04-08 DIAGNOSIS — R30.0 DYSURIA: ICD-10-CM

## 2020-04-08 PROCEDURE — 99214 OFFICE O/P EST MOD 30 MIN: CPT | Mod: 95,CR | Performed by: NURSE PRACTITIONER

## 2020-04-08 RX ORDER — NITROFURANTOIN 25; 75 MG/1; MG/1
100 CAPSULE ORAL 2 TIMES DAILY
Qty: 10 CAP | Refills: 0 | Status: SHIPPED | OUTPATIENT
Start: 2020-04-08 | End: 2020-06-24

## 2020-04-08 RX ORDER — PHENAZOPYRIDINE HYDROCHLORIDE 200 MG/1
200 TABLET, FILM COATED ORAL 3 TIMES DAILY PRN
Qty: 6 TAB | Refills: 0 | Status: SHIPPED | OUTPATIENT
Start: 2020-04-08 | End: 2020-06-24

## 2020-04-08 RX ORDER — LEVONORGESTREL AND ETHINYL ESTRADIOL 0.1-0.02MG
KIT ORAL
COMMUNITY
Start: 2020-02-11 | End: 2020-08-03 | Stop reason: SDUPTHER

## 2020-04-08 RX ORDER — AMOXICILLIN AND CLAVULANATE POTASSIUM 500; 125 MG/1; MG/1
TABLET, FILM COATED ORAL
COMMUNITY
Start: 2020-03-12 | End: 2020-04-08

## 2020-04-08 ASSESSMENT — PATIENT HEALTH QUESTIONNAIRE - PHQ9: CLINICAL INTERPRETATION OF PHQ2 SCORE: 0

## 2020-04-08 NOTE — ASSESSMENT & PLAN NOTE
"Patient presents today for OnDemand video virtual exam for symptoms very consistent with UTI.  She has had burning with urination, frequency and cloudy urine for the last 4 days.  She has had a UTI before and symptoms are \"exactly the same.\"  Discussed treatment guidelines and in the setting of the COVID-19 pandemic will go ahead and treat empirically with Macrobid, she was also given a prescription for Pyridium for pain.  She does understand that this medication will turn urine bright orange/red.  We discussed the importance of UTI prevention.  "

## 2020-04-08 NOTE — PROGRESS NOTES
"Telemedicine Visit: Established Patient     This encounter was conducted via Zoom .   Verbal consent was obtained. Patient's identity was verified.    Subjective:   CC:   Jo Ann Vazquez is a 24 y.o. female presenting for evaluation and management of:    Dysuria    Dysuria  Patient presents today for OnDemand video virtual exam for symptoms very consistent with UTI.  She has had burning with urination, frequency and cloudy urine for the last 4 days.  She has had a UTI before and symptoms are \"exactly the same.\"  Discussed treatment guidelines and in the setting of the COVID-19 pandemic will go ahead and treat empirically with Macrobid, she was also given a prescription for Pyridium for pain.  She does understand that this medication will turn urine bright orange/red.  We discussed the importance of UTI prevention.        ROS   Denies any recent fevers or chills. No nausea or vomiting. No chest pains or shortness of breath.    ROS: positive per HPI    No Known Allergies    Current medicines (including changes today)  Current Outpatient Medications   Medication Sig Dispense Refill   • levonorgestrel-ethinyl estradiol (AVIANE) 0.1-20 MG-MCG per tablet      • Cranberry 125 MG Tab Take  by mouth.     • nitrofurantoin (MACROBID) 100 MG Cap Take 1 Cap by mouth 2 times a day. 10 Cap 0   • phenazopyridine (PYRIDIUM) 200 MG Tab Take 1 Tab by mouth 3 times a day as needed. 6 Tab 0     No current facility-administered medications for this visit.        Patient Active Problem List    Diagnosis Date Noted   • Dysuria 04/08/2020   • Postpartum care following vaginal delivery 06/21/2019   • Tobacco use disorder 03/10/2017   • Healthcare maintenance 03/10/2017   • Other psoriasis 09/16/2009       Family History   Problem Relation Age of Onset   • Diabetes Maternal Grandfather    • Cancer Mother         ovarian   • Cancer Maternal Grandmother        She  has a past medical history of Other psoriasis.  She  has a past surgical " history that includes other.       Objective:   Vitals obtained by patient:    /67   Temp 37 °C (98.6 °F) (Temporal)   Wt 66.7 kg (147 lb)   BMI 25.23 kg/m²     Physical Exam:  Constitutional: Alert, no distress, well-groomed.  Skin: No rashes in visible areas.  Eye: Round. Conjunctiva clear, lids normal. No icterus.   ENMT: Lips pink without lesions, good dentition, moist mucous membranes. Phonation normal.  Neck: No masses, no thyromegaly. Moves freely without pain.  CV: Pulse as reported by patient  Respiratory: Unlabored respiratory effort, no cough or audible wheeze  Psych: Alert and oriented x3, normal affect and mood.       Assessment and Plan:   The following treatment plan was discussed:     1. Dysuria    Other orders  - levonorgestrel-ethinyl estradiol (AVIANE) 0.1-20 MG-MCG per tablet  - Cranberry 125 MG Tab; Take  by mouth.  - nitrofurantoin (MACROBID) 100 MG Cap; Take 1 Cap by mouth 2 times a day.  Dispense: 10 Cap; Refill: 0  - phenazopyridine (PYRIDIUM) 200 MG Tab; Take 1 Tab by mouth 3 times a day as needed.  Dispense: 6 Tab; Refill: 0        Follow-up: No follow-ups on file.

## 2020-06-24 ENCOUNTER — OFFICE VISIT (OUTPATIENT)
Dept: URGENT CARE | Facility: PHYSICIAN GROUP | Age: 25
End: 2020-06-24
Payer: COMMERCIAL

## 2020-06-24 VITALS
WEIGHT: 150 LBS | RESPIRATION RATE: 16 BRPM | TEMPERATURE: 98 F | SYSTOLIC BLOOD PRESSURE: 118 MMHG | HEIGHT: 64 IN | HEART RATE: 74 BPM | DIASTOLIC BLOOD PRESSURE: 82 MMHG | OXYGEN SATURATION: 96 % | BODY MASS INDEX: 25.61 KG/M2

## 2020-06-24 DIAGNOSIS — J06.9 VIRAL URI WITH COUGH: ICD-10-CM

## 2020-06-24 PROCEDURE — 99213 OFFICE O/P EST LOW 20 MIN: CPT | Performed by: FAMILY MEDICINE

## 2020-06-24 NOTE — PROGRESS NOTES
"Subjective:      Jo Ann Vazquez is a 24 y.o. female who presents with Letter for School/Work (Pt states she needs note to return to work. )            This is a new problem.  24-year-old presenting with 3-day history of cough and congestion without any fever or shortness of breath.  No other ill contacts.  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  Has been using over-the-counter Mucinex.  No other ill contacts reported.  Otherwise healthy.      Review of Systems   All other systems reviewed and are negative.         Objective:     /82   Pulse 74   Temp 36.7 °C (98 °F) (Temporal)   Resp 16   Ht 1.626 m (5' 4\")   Wt 68 kg (150 lb)   SpO2 96%   BMI 25.75 kg/m²      Physical Exam  Constitutional:       General: She is not in acute distress.     Appearance: She is not ill-appearing, toxic-appearing or diaphoretic.   HENT:      Head: Normocephalic and atraumatic.      Right Ear: Tympanic membrane, ear canal and external ear normal.      Left Ear: Tympanic membrane, ear canal and external ear normal.      Nose: Nose normal.      Mouth/Throat:      Mouth: Mucous membranes are moist.      Pharynx: Oropharynx is clear. Uvula midline. No pharyngeal swelling, oropharyngeal exudate, posterior oropharyngeal erythema or uvula swelling.      Tonsils: No tonsillar exudate or tonsillar abscesses.   Eyes:      Conjunctiva/sclera: Conjunctivae normal.   Neck:      Musculoskeletal: Neck supple.   Cardiovascular:      Rate and Rhythm: Normal rate and regular rhythm.      Heart sounds: No murmur. No friction rub. No gallop.    Pulmonary:      Effort: Pulmonary effort is normal. No respiratory distress.      Breath sounds: No stridor. No wheezing, rhonchi or rales.   Lymphadenopathy:      Cervical: No cervical adenopathy.   Skin:     General: Skin is warm.      Coloration: Skin is not jaundiced or pale.   Neurological:      Mental Status: She is alert and oriented to person, place, " and time.   Psychiatric:         Mood and Affect: Mood normal.                 Assessment/Plan:       1. Viral URI with cough    Treatment is supportive at the present time.  10-day work excuse given from the day is started which was 3 days ago so she has another week to stay home and continue symptomatic treatment.  Warning signs reviewed  Plan per orders and instructions

## 2020-06-24 NOTE — LETTER
June 24, 2020         Patient: Jo Ann Vazquez   YOB: 1995   Date of Visit: 6/24/2020           To Whom it May Concern:    Jo Ann Vazquez was seen in my clinic on 6/24/2020. She may return to work on 07/02/2020.    If you have any questions or concerns, please don't hesitate to call.        Sincerely,           Nayeli Tai M.D.  Electronically Signed

## 2020-08-03 ENCOUNTER — OFFICE VISIT (OUTPATIENT)
Dept: MEDICAL GROUP | Facility: PHYSICIAN GROUP | Age: 25
End: 2020-08-03
Payer: COMMERCIAL

## 2020-08-03 ENCOUNTER — HOSPITAL ENCOUNTER (OUTPATIENT)
Facility: MEDICAL CENTER | Age: 25
End: 2020-08-03
Attending: NURSE PRACTITIONER
Payer: COMMERCIAL

## 2020-08-03 VITALS
BODY MASS INDEX: 25.78 KG/M2 | OXYGEN SATURATION: 97 % | SYSTOLIC BLOOD PRESSURE: 110 MMHG | HEIGHT: 64 IN | HEART RATE: 71 BPM | WEIGHT: 151 LBS | DIASTOLIC BLOOD PRESSURE: 68 MMHG | RESPIRATION RATE: 14 BRPM | TEMPERATURE: 98.2 F

## 2020-08-03 DIAGNOSIS — Z12.4 SCREENING FOR CERVICAL CANCER: ICD-10-CM

## 2020-08-03 DIAGNOSIS — Z11.51 SCREENING FOR HPV (HUMAN PAPILLOMAVIRUS): ICD-10-CM

## 2020-08-03 DIAGNOSIS — Z01.419 ENCOUNTER FOR GYNECOLOGICAL EXAMINATION: ICD-10-CM

## 2020-08-03 DIAGNOSIS — Z01.419 WELL WOMAN EXAM WITH ROUTINE GYNECOLOGICAL EXAM: ICD-10-CM

## 2020-08-03 DIAGNOSIS — Z30.41 ENCOUNTER FOR SURVEILLANCE OF CONTRACEPTIVE PILLS: ICD-10-CM

## 2020-08-03 PROBLEM — N80.9 ENDOMETRIOSIS: Status: ACTIVE | Noted: 2020-08-03

## 2020-08-03 PROCEDURE — 87624 HPV HI-RISK TYP POOLED RSLT: CPT

## 2020-08-03 PROCEDURE — 88175 CYTOPATH C/V AUTO FLUID REDO: CPT

## 2020-08-03 PROCEDURE — 87591 N.GONORRHOEAE DNA AMP PROB: CPT

## 2020-08-03 PROCEDURE — 87491 CHLMYD TRACH DNA AMP PROBE: CPT

## 2020-08-03 PROCEDURE — 99395 PREV VISIT EST AGE 18-39: CPT | Performed by: NURSE PRACTITIONER

## 2020-08-03 RX ORDER — LEVONORGESTREL AND ETHINYL ESTRADIOL 0.1-0.02MG
1 KIT ORAL DAILY
Qty: 84 TAB | Refills: 3 | Status: SHIPPED | OUTPATIENT
Start: 2020-08-03 | End: 2021-08-26

## 2020-08-03 ASSESSMENT — LIFESTYLE VARIABLES
HAVE YOU EVER GOTTEN IN TROUBLE WHILE YOU WERE USING ALCOHOL OR DRUGS: NO
DO YOUR FAMILY OR FRIENDS EVER TELL YOU THAT YOU SHOULD CUT DOWN ON YOUR DRINKING OR DRUG USE: NO
DURING THE PAST 12 MONTHS, ON HOW MANY DAYS DID YOU DRINK MORE THAN A FEW SIPS OF BEER, WINE, OR ANY DRINK CONTAINING ALCOHOL: 12
DURING THE PAST 12 MONTHS, ON HOW MANY DAYS DID YOU USE ANY MARIJUANA: 0
HAVE YOU EVER RIDDEN IN A CAR DRIVEN BY SOMEONE WHO WAS HIGH OR HAD BEEN USING ALCOHOL OR DRUGS: NO
DURING THE PAST 12 MONTHS, ON HOW MANY DAYS DID YOU USE ANY TOBACCO OR NICOTINE PRODUCTS: 365
DO YOU EVER USE ALCOHOL OR DRUGS WHILE YOU ARE BY YOURSELF ALONE: NO
DO YOU EVER FORGET THINGS YOU DID WHILE USING ALCOHOL OR DRUGS: NO
PART A TOTAL SCORE: 377
DO YOU EVER USE ALCOHOL OR DRUGS TO RELAX, FEEL BETTER ABOUT YOURSELF, OR FIT IN: NO
DURING THE PAST 12 MONTHS, ON HOW MANY DAYS DID YOU USE ANYTHING ELSE TO GET HIGH: 0

## 2020-08-03 NOTE — ASSESSMENT & PLAN NOTE
Pt here for well woman exam including PAP/HPV. Her last PAP was 1 year ago nd it was normal. She denies concerns for unusual vaginal discharge, odor, itching, or pain with intercourse. Her menses are regular since on OCP.    She does not have any concerns with her breasts. She is not up to date with her mammogram due to age. She does do monthly self breast exams. She was encouraged to continue with monthly SBEs, instructions were given during exam.

## 2020-08-03 NOTE — PROGRESS NOTES
Chief Complaint   Patient presents with   • Gynecologic Exam         This is a 24 y.o.female patient that presents today with the following: Pap, birth control refill    Well woman exam with routine gynecological exam  Pt here for well woman exam including PAP/HPV. Her last PAP was 1 year ago nd it was normal. She denies concerns for unusual vaginal discharge, odor, itching, or pain with intercourse. Her menses are regular since on OCP.    She does not have any concerns with her breasts. She is not up to date with her mammogram due to age. She does do monthly self breast exams. She was encouraged to continue with monthly SBEs, instructions were given during exam.           No visits with results within 1 Month(s) from this visit.   Latest known visit with results is:   Hospital Outpatient Visit on 02/03/2020   Component Date Value   • Significant Indicator 02/03/2020 POS*   • Source 02/03/2020 UR    • Site 02/03/2020 -    • Culture Result 02/03/2020 Mixed skin yuriy <10,000 cfu/mL*   • Culture Result 02/03/2020 *                    Value:Escherichia coli  10-50,000 cfu/mL           clinical course has been stable    Past Medical History:   Diagnosis Date   • Other psoriasis        Past Surgical History:   Procedure Laterality Date   • OTHER      none reported       Family History   Problem Relation Age of Onset   • Diabetes Maternal Grandfather    • Cancer Mother         ovarian   • Cancer Maternal Grandmother        Patient has no known allergies.    Current Outpatient Medications Ordered in Epic   Medication Sig Dispense Refill   • levonorgestrel-ethinyl estradiol (AVIANE) 0.1-20 MG-MCG per tablet Take 1 Tab by mouth every day. 84 Tab 3     No current Epic-ordered facility-administered medications on file.        Constitutional ROS: No unexpected change in weight, No weakness, No unexplained fevers, sweats, or chills  Pulmonary ROS: No chronic cough, sputum, or hemoptysis, No shortness of breath, No recent change  "in breathing  Cardiovascular ROS: No chest pain  Gastrointestinal ROS: No abdominal pain, No nausea, vomiting, diarrhea, or constipation  Musculoskeletal/Extremities ROS: No clubbing, No peripheral edema, No pain, redness or swelling on the joints  Neurologic ROS: Normal development, No seizures, No weakness   ROS: Negative for unusual vaginal discharge, odor, itching or pain with intercourse    Physical exam:  /68   Pulse 71   Temp 36.8 °C (98.2 °F) (Temporal)   Resp 14   Ht 1.626 m (5' 4\")   Wt 68.5 kg (151 lb)   SpO2 97%   BMI 25.92 kg/m²   General Appearance: Very pleasant young female, alert, no distress, well-nourished, well-groomed  Skin: Skin color, texture, turgor normal. No rashes or lesions.  Lungs: negative findings: normal respiratory rate and rhythm, lungs clear to auscultation  Heart: negative. RRR without murmur, gallop, or rubs.  No ectopy.  Abdomen: Abdomen soft, non-tender. BS normal. No masses,  No organomegaly  Musculoskeletal: negative findings: no evidence of joint instability, no evidence of muscle atrophy, no deformities present  Neurologic: intact, CN II through XII grossly intact  Pelvic: External genitalia, cervix, and vagina normal., Bimanual exam normal.    Medical decision making/discussion:     We will notify patient of results and further actions if needed    OCPs refilled, to take daily as prescribed    Follow-up annually and as needed    Jo Ann Spencer was seen today for gynecologic exam.    Diagnoses and all orders for this visit:    Well woman exam with routine gynecological exam    Screening for HPV (human papillomavirus)  -     Thinprep Pap W/HPV and CTNG; Future    Encounter for gynecological examination  -     Thinprep Pap W/HPV and CTNG; Future    Screening for cervical cancer  -     Thinprep Pap W/HPV and CTNG; Future    Encounter for surveillance of contraceptive pills  -     levonorgestrel-ethinyl estradiol (AVIANE) 0.1-20 MG-MCG per tablet; Take 1 Tab by " mouth every day.        Return in about 1 year (around 8/3/2021) for Follow-up.        Please note that this dictation was created using voice recognition software. I have made every reasonable attempt to correct obvious errors, but I expect that there are errors of grammar and possibly content that I did not discover before finalizing the note.

## 2020-08-05 LAB
C TRACH DNA GENITAL QL NAA+PROBE: NEGATIVE
CYTOLOGY REG CYTOL: NORMAL
HPV HR 12 DNA CVX QL NAA+PROBE: NEGATIVE
HPV16 DNA SPEC QL NAA+PROBE: NEGATIVE
HPV18 DNA SPEC QL NAA+PROBE: NEGATIVE
N GONORRHOEA DNA GENITAL QL NAA+PROBE: NEGATIVE
SPECIMEN SOURCE: NORMAL
SPECIMEN SOURCE: NORMAL

## 2020-10-02 ENCOUNTER — OFFICE VISIT (OUTPATIENT)
Dept: URGENT CARE | Facility: PHYSICIAN GROUP | Age: 25
End: 2020-10-02
Payer: COMMERCIAL

## 2020-10-02 VITALS
HEART RATE: 120 BPM | TEMPERATURE: 97.4 F | SYSTOLIC BLOOD PRESSURE: 128 MMHG | HEIGHT: 64 IN | DIASTOLIC BLOOD PRESSURE: 72 MMHG | WEIGHT: 147.2 LBS | RESPIRATION RATE: 16 BRPM | BODY MASS INDEX: 25.13 KG/M2 | OXYGEN SATURATION: 95 %

## 2020-10-02 DIAGNOSIS — N39.0 ACUTE URINARY TRACT INFECTION: ICD-10-CM

## 2020-10-02 LAB
APPEARANCE UR: CLEAR
BILIRUB UR STRIP-MCNC: NEGATIVE MG/DL
COLOR UR AUTO: YELLOW
GLUCOSE UR STRIP.AUTO-MCNC: NEGATIVE MG/DL
KETONES UR STRIP.AUTO-MCNC: NEGATIVE MG/DL
LEUKOCYTE ESTERASE UR QL STRIP.AUTO: NORMAL
NITRITE UR QL STRIP.AUTO: POSITIVE
PH UR STRIP.AUTO: 7 [PH] (ref 5–8)
PROT UR QL STRIP: NEGATIVE MG/DL
RBC UR QL AUTO: NEGATIVE
SP GR UR STRIP.AUTO: 1.02
UROBILINOGEN UR STRIP-MCNC: 1 MG/DL

## 2020-10-02 PROCEDURE — 81002 URINALYSIS NONAUTO W/O SCOPE: CPT | Performed by: FAMILY MEDICINE

## 2020-10-02 PROCEDURE — 99214 OFFICE O/P EST MOD 30 MIN: CPT | Performed by: FAMILY MEDICINE

## 2020-10-02 RX ORDER — NITROFURANTOIN 25; 75 MG/1; MG/1
100 CAPSULE ORAL EVERY 12 HOURS
Qty: 10 CAP | Refills: 0 | Status: SHIPPED | OUTPATIENT
Start: 2020-10-02 | End: 2020-10-07

## 2020-10-02 RX ORDER — PHENAZOPYRIDINE HYDROCHLORIDE 200 MG/1
TABLET, FILM COATED ORAL
Qty: 9 TAB | Refills: 0 | Status: SHIPPED | OUTPATIENT
Start: 2020-10-02 | End: 2021-05-11

## 2020-10-02 ASSESSMENT — PAIN SCALES - GENERAL: PAINLEVEL: 8=MODERATE-SEVERE PAIN

## 2020-10-02 NOTE — PROGRESS NOTES
Chief Complaint:    Chief Complaint   Patient presents with   • UTI     urgency, burning, cloudiness. x2 days.        History of Present Illness:    This is a new problem. Symptoms x 2 days. Has dysuria, urinary frequency, urinary urgency, and cloudy urine. Feels like typical UTI. Nitrofurantoin and Pyridium worked and were tolerated for UTI 4/8/2020. Last positive urine culture in Epic (2/3/2020) noted below.    Urine Culture  Order: [id number]  Status:  Final result   Visible to patient:  Yes (MyChart) Next appt:  08[facility]09/2021 [facility] ([facility], A[facility] Dx:  Dysuria; Urinary frequency; Urinary u...  Specimen Information: Urine        Component 8mo ago   Significant Indicator POSPositive (POS)    Source UR    Site -    Culture Result Mixed skin yuriy <10,000 cfu/mLAbnormal     Culture Result Abnormal   Escherichia coli   10-50,000 cfu/mL     Resulting Agency M   Susceptibility     Escherichia coli     ALEXIA     Ampicillin >16 mcg/mL Resistant     Ampicillin/sulbactam 16/8 mcg/mL Intermediate     Cefazolin <=2 mcg/mL Sensitive     Cefepime <=2 mcg/mL Sensitive     Cefotaxime <=2 mcg/mL Sensitive     Cefotetan <=16 mcg/mL Sensitive     Ceftazidime <=1 mcg/mL Sensitive     Ceftriaxone <=1 mcg/mL Sensitive     Ciprofloxacin <=1 mcg/mL Sensitive     Gentamicin <=4 mcg/mL Sensitive     Levofloxacin <=2 mcg/mL Sensitive     Nitrofurantoin <=32 mcg/mL Sensitive     Pip/Tazobactam <=16 mcg/mL Sensitive     Tobramycin <=4 mcg/mL Sensitive     Trimeth/Sulfa <=2/38 mcg/mL Sensitive              Narrative  Performed by: M  Indication for culture:->Outpatient   Total urine volume units?->ML   Indication for culture:->Outpatient      Specimen Collected: 02/03/20 12:09 PM Last Resulted: 02/05/20  8:15 AM             Review of Systems:    Constitutional: Negative for fever, chills, and diaphoresis.   Eyes: Negative for change in vision, photophobia, pain, redness, and discharge.  ENT: Negative for ear  pain, ear discharge, hearing loss, tinnitus, nasal congestion, nosebleeds, and sore throat.    Respiratory: Negative for cough, hemoptysis, sputum production, shortness of breath, wheezing, and stridor.    Cardiovascular: Negative for chest pain, palpitations, orthopnea, claudication, leg swelling, and PND.   Gastrointestinal: Negative for abdominal pain, nausea, vomiting, diarrhea, constipation, blood in stool, and melena.   Genitourinary: See HPI.  Musculoskeletal: Negative for myalgias, joint pain, neck pain, and back pain.   Skin: Negative for rash and itching.   Neurological: Negative for dizziness, tingling, tremors, sensory change, speech change, focal weakness, seizures, loss of consciousness, and headaches.   Endo: Negative for polydipsia.   Heme: Does not bruise/bleed easily.   Psychiatric/Behavioral: Negative for depression, suicidal ideas, hallucinations, memory loss and substance abuse. The patient is not nervous/anxious and does not have insomnia.        Past Medical History:    Past Medical History:   Diagnosis Date   • Other psoriasis      Past Surgical History:    Past Surgical History:   Procedure Laterality Date   • OTHER      none reported     Social History:    Social History     Socioeconomic History   • Marital status: Single     Spouse name: Not on file   • Number of children: Not on file   • Years of education: Not on file   • Highest education level: Not on file   Occupational History   • Not on file   Social Needs   • Financial resource strain: Not on file   • Food insecurity     Worry: Not on file     Inability: Not on file   • Transportation needs     Medical: Not on file     Non-medical: Not on file   Tobacco Use   • Smoking status: Former Smoker     Packs/day: 0.15     Years: 2.00     Pack years: 0.30     Types: Cigarettes     Quit date: 6/1/2017     Years since quitting: 3.3   • Smokeless tobacco: Never Used   Substance and Sexual Activity   • Alcohol use: No     Alcohol/week: 0.0 oz  "  • Drug use: No   • Sexual activity: Yes     Partners: Male     Birth control/protection: Pill     Comment: Student/ Single   Lifestyle   • Physical activity     Days per week: Not on file     Minutes per session: Not on file   • Stress: Not on file   Relationships   • Social connections     Talks on phone: Not on file     Gets together: Not on file     Attends Qu[employer] service: Not on file     Active member of club or organization: Not on file     Attends meetings of clubs or organizations: Not on file     Relationship status: Not on file   • Intimate partner violence     Fear of current or ex partner: Not on file     Emotionally abused: Not on file     Physically abused: Not on file     Forced sexual activity: Not on file   Other Topics Concern   • Not on file   Social History Narrative   • Not on file     Family History:    Family History   Problem Relation Age of Onset   • Diabetes Maternal Grandfather    • Cancer Mother         ovarian   • Cancer Maternal Grandmother      Medications:    Current Outpatient Medications on File Prior to Visit   Medication Sig Dispense Refill   • levonorgestrel-ethinyl estradiol (AVIANE) 0.1-20 MG-MCG per tablet Take 1 Tab by mouth every day. 84 Tab 3     No current facility-administered medications on file prior to visit.      Allergies:    No Known Allergies      Vitals:    Vitals:    10/02/20 1508   BP: 128/72   Pulse: (!) 120   Resp: 16   Temp: 36.3 °C (97.4 °F)   TempSrc: Temporal   SpO2: 95%   Weight: 66.8 kg (147 lb 3.2 oz)   Height: 1.626 m (5' 4\")       Physical Exam:    Constitutional: Vital signs reviewed. Appears well-developed and well-nourished. No acute distress.   Eyes: Sclera white, conjunctivae clear.   ENT: External ears normal. Hearing normal.  Neck: Neck supple.   Pulmonary/Chest: Respirations non-labored.   Abdomen: Bowel sounds are normal active. Soft, non-distended, and non-tender to palpation.    Musculoskeletal: No CVA TTP bilaterally. Normal gait. " Normal range of motion. No muscular atrophy or weakness.  Neurological: Alert and oriented to person, place, and time. Muscle tone normal. Coordination normal. Light touch and sensation normal.   Skin: No rashes or lesions. Warm, dry, normal turgor.  Psychiatric: Normal mood and affect. Behavior is normal. Judgment and thought content normal.       Diagnostics:    POCT Urinalysis  Order: [id number]  Status:  Final result   Visible to patient:  No (not released) Next appt:  08/09/2021 at 10:40 AM [facility] ([email or web address]) Dx:  Acute urinary tract infection   Ref Range & Units 5:23 PM 8mo ago 9mo ago 2yr ago   POC Color Negative YELLOW  Yellow  yellow  yellow    POC Appearance Negative CLEAR  cloudy  clear  cloudy    POC Leukocyte Esterase Negative SMALL  large  small  large    POC Nitrites Negative POSITIVE  neg  neg  pink    POC Urobiligen Negative (0.2) mg/dL 1.0  0.2  0.2  neg    POC Protein Negative mg/dL NEGATIVE  neg  neg  30    POC Urine PH 5.0 - 8.0 7.0  6.5  7.0  7.5    POC Blood Negative NEGATIVE  neg  neg  small    POC Specific Gravity <1.005 - >1.030 1.020  1.010  1.015  1.010    POC Ketones Negative mg/dL NEGATIVE  neg  neg  neg    POC Bilirubin Negative mg/dL NEGATIVE  neg  neg  neg    POC Glucose Negative mg/dL NEGATIVE  neg  neg  neg          Specimen Collected: 10/02/20  5:23 PM Last Resulted: 10/02/20  6:28 PM             Assessment / Plan:    1. Acute urinary tract infection  - POCT Urinalysis  - nitrofurantoin (MACROBID) 100 MG Cap; Take 1 Cap by mouth every 12 hours for 5 days.  Dispense: 10 Cap; Refill: 0  - phenazopyridine (PYRIDIUM) 200 MG Tab; 1 TAB BY MOUTH UP TO 3 TIMES A DAY ONLY IF NEEDED FOR BLADDER OR URINARY PAIN. WILL TURN URINE ORANGE.  Dispense: 9 Tab; Refill: 0      Discussed with her DDX, management options, and risks, benefits, and alternatives to treatment plan agreed upon.    Declines urine culture.    Agreeable to medications prescribed.    Discussed  expected course of duration, time for improvement, and to seek follow-up in Emergency Room, urgent care, or with PCP if getting worse at any time or not improving within expected time frame.

## 2020-12-28 ENCOUNTER — APPOINTMENT (OUTPATIENT)
Dept: MEDICAL GROUP | Facility: PHYSICIAN GROUP | Age: 25
End: 2020-12-28
Payer: COMMERCIAL

## 2021-05-06 ENCOUNTER — HOSPITAL ENCOUNTER (OUTPATIENT)
Facility: MEDICAL CENTER | Age: 26
End: 2021-05-06
Attending: NURSE PRACTITIONER
Payer: MEDICAID

## 2021-05-06 ENCOUNTER — OFFICE VISIT (OUTPATIENT)
Dept: URGENT CARE | Facility: PHYSICIAN GROUP | Age: 26
End: 2021-05-06
Payer: MEDICAID

## 2021-05-06 VITALS
HEIGHT: 64 IN | DIASTOLIC BLOOD PRESSURE: 74 MMHG | HEART RATE: 86 BPM | BODY MASS INDEX: 25.27 KG/M2 | RESPIRATION RATE: 14 BRPM | WEIGHT: 148 LBS | OXYGEN SATURATION: 98 % | TEMPERATURE: 98.7 F | SYSTOLIC BLOOD PRESSURE: 122 MMHG

## 2021-05-06 DIAGNOSIS — N30.01 ACUTE CYSTITIS WITH HEMATURIA: ICD-10-CM

## 2021-05-06 LAB
APPEARANCE UR: CLEAR
BILIRUB UR STRIP-MCNC: NEGATIVE MG/DL
COLOR UR AUTO: YELLOW
GLUCOSE UR STRIP.AUTO-MCNC: 100 MG/DL
INT CON NEG: NORMAL
INT CON POS: NORMAL
KETONES UR STRIP.AUTO-MCNC: NEGATIVE MG/DL
LEUKOCYTE ESTERASE UR QL STRIP.AUTO: NORMAL
NITRITE UR QL STRIP.AUTO: POSITIVE
PH UR STRIP.AUTO: 6 [PH] (ref 5–8)
POC URINE PREGNANCY TEST: NEGATIVE
PROT UR QL STRIP: NEGATIVE MG/DL
RBC UR QL AUTO: NORMAL
SP GR UR STRIP.AUTO: 1.01
UROBILINOGEN UR STRIP-MCNC: 1 MG/DL

## 2021-05-06 PROCEDURE — 81002 URINALYSIS NONAUTO W/O SCOPE: CPT | Performed by: NURSE PRACTITIONER

## 2021-05-06 PROCEDURE — 99213 OFFICE O/P EST LOW 20 MIN: CPT | Mod: 25 | Performed by: NURSE PRACTITIONER

## 2021-05-06 PROCEDURE — 81025 URINE PREGNANCY TEST: CPT | Performed by: NURSE PRACTITIONER

## 2021-05-06 PROCEDURE — 87086 URINE CULTURE/COLONY COUNT: CPT

## 2021-05-06 PROCEDURE — 87077 CULTURE AEROBIC IDENTIFY: CPT

## 2021-05-06 RX ORDER — NITROFURANTOIN 25; 75 MG/1; MG/1
100 CAPSULE ORAL 2 TIMES DAILY
Qty: 10 CAPSULE | Refills: 0 | Status: SHIPPED | OUTPATIENT
Start: 2021-05-06 | End: 2021-05-11

## 2021-05-06 ASSESSMENT — ENCOUNTER SYMPTOMS
NAUSEA: 0
ABDOMINAL PAIN: 1
FLANK PAIN: 1
FEVER: 0
VOMITING: 0

## 2021-05-06 NOTE — PATIENT INSTRUCTIONS
Urinary Tract Infection, Adult    A urinary tract infection (UTI) is an infection of any part of the urinary tract. The urinary tract includes the kidneys, ureters, bladder, and urethra. These organs make, store, and get rid of urine in the body.  Your health care provider may use other names to describe the infection. An upper UTI affects the ureters and kidneys (pyelonephritis). A lower UTI affects the bladder (cystitis) and urethra (urethritis).  What are the causes?  Most urinary tract infections are caused by bacteria in your genital area, around the entrance to your urinary tract (urethra). These bacteria grow and cause inflammation of your urinary tract.  What increases the risk?  You are more likely to develop this condition if:  · You have a urinary catheter that stays in place (indwelling).  · You are not able to control when you urinate or have a bowel movement (you have incontinence).  · You are female and you:  ? Use a spermicide or diaphragm for birth control.  ? Have low estrogen levels.  ? Are pregnant.  · You have certain genes that increase your risk (genetics).  · You are sexually active.  · You take antibiotic medicines.  · You have a condition that causes your flow of urine to slow down, such as:  ? An enlarged prostate, if you are male.  ? Blockage in your urethra (stricture).  ? A kidney stone.  ? A nerve condition that affects your bladder control (neurogenic bladder).  ? Not getting enough to drink, or not urinating often.  · You have certain medical conditions, such as:  ? Diabetes.  ? A weak disease-fighting system (immunesystem).  ? Sickle cell disease.  ? Gout.  ? Spinal cord injury.  What are the signs or symptoms?  Symptoms of this condition include:  · Needing to urinate right away (urgently).  · Frequent urination or passing small amounts of urine frequently.  · Pain or burning with urination.  · Blood in the urine.  · Urine that smells bad or unusual.  · Trouble urinating.  · Cloudy  urine.  · Vaginal discharge, if you are female.  · Pain in the abdomen or the lower back.  You may also have:  · Vomiting or a decreased appetite.  · Confusion.  · Irritability or tiredness.  · A fever.  · Diarrhea.  The first symptom in older adults may be confusion. In some cases, they may not have any symptoms until the infection has worsened.  How is this diagnosed?  This condition is diagnosed based on your medical history and a physical exam. You may also have other tests, including:  · Urine tests.  · Blood tests.  · Tests for sexually transmitted infections (STIs).  If you have had more than one UTI, a cystoscopy or imaging studies may be done to determine the cause of the infections.  How is this treated?  Treatment for this condition includes:  · Antibiotic medicine.  · Over-the-counter medicines to treat discomfort.  · Drinking enough water to stay hydrated.  If you have frequent infections or have other conditions such as a kidney stone, you may need to see a health care provider who specializes in the urinary tract (urologist).  In rare cases, urinary tract infections can cause sepsis. Sepsis is a life-threatening condition that occurs when the body responds to an infection. Sepsis is treated in the hospital with IV antibiotics, fluids, and other medicines.  Follow these instructions at home:    Medicines  · Take over-the-counter and prescription medicines only as told by your health care provider.  · If you were prescribed an antibiotic medicine, take it as told by your health care provider. Do not stop using the antibiotic even if you start to feel better.  General instructions  · Make sure you:  ? Empty your bladder often and completely. Do not hold urine for long periods of time.  ? Empty your bladder after sex.  ? Wipe from front to back after a bowel movement if you are female. Use each tissue one time when you wipe.  · Drink enough fluid to keep your urine pale yellow.  · Keep all follow-up  visits as told by your health care provider. This is important.  Contact a health care provider if:  · Your symptoms do not get better after 1-2 days.  · Your symptoms go away and then return.  Get help right away if you have:  · Severe pain in your back or your lower abdomen.  · A fever.  · Nausea or vomiting.  Summary  · A urinary tract infection (UTI) is an infection of any part of the urinary tract, which includes the kidneys, ureters, bladder, and urethra.  · Most urinary tract infections are caused by bacteria in your genital area, around the entrance to your urinary tract (urethra).  · Treatment for this condition often includes antibiotic medicines.  · If you were prescribed an antibiotic medicine, take it as told by your health care provider. Do not stop using the antibiotic even if you start to feel better.  · Keep all follow-up visits as told by your health care provider. This is important.  This information is not intended to replace advice given to you by your health care provider. Make sure you discuss any questions you have with your health care provider.  Document Released: 09/27/2006 Document Revised: 12/05/2019 Document Reviewed: 06/27/2019  [email or web address] Patient Education © 2020 [email or web address]

## 2021-05-06 NOTE — PROGRESS NOTES
Subjective:     Jo Ann Vazquez is a 25 y.o. female who presents for UTI (x 3 days , burning sensation )      Mild right flank pain, described as discomfort. No hx of kidney stones. Hx of kidney infection during pregnancy. LMP, due in 3 days. No irregular vaginal discharge or bleeding.    UTI  This is a new problem. The current episode started in the past 7 days. The problem occurs daily. The problem has been gradually worsening. Associated symptoms include abdominal pain and urinary symptoms. Pertinent negatives include no fever, nausea or vomiting. Nothing aggravates the symptoms. Treatments tried: Cranberry pills, probiotic, AZO. The treatment provided mild relief.       Past Medical History:   Diagnosis Date   • Other psoriasis        Past Surgical History:   Procedure Laterality Date   • OTHER      none reported       Social History     Socioeconomic History   • Marital status: Single     Spouse name: Not on file   • Number of children: Not on file   • Years of education: Not on file   • Highest education level: Not on file   Occupational History   • Not on file   Tobacco Use   • Smoking status: Former Smoker     Packs/day: 0.15     Years: 2.00     Pack years: 0.30     Types: Cigarettes     Quit date: 6/1/2017     Years since quitting: 3.9   • Smokeless tobacco: Never Used   Substance and Sexual Activity   • Alcohol use: No     Alcohol/week: 0.0 oz   • Drug use: No   • Sexual activity: Yes     Partners: Male     Birth control/protection: Pill     Comment: Student/ Single   Other Topics Concern   • Not on file   Social History Narrative   • Not on file     Social Determinants of Health     Financial Resource Strain:    • Difficulty of Paying Living Expenses:    Food Insecurity:    • Worried About Running Out of Food in the Last Year:    • Ran Out of Food in the Last Year:    Transportation Needs:    • Lack of Transportation (Medical):    • Lack of Transportation (Non-Medical):    Physical Activity:    •  "Days of Exercise per Week:    • Minutes of Exercise per Session:    Stress:    • Feeling of Stress :    Social Connections:    • Frequency of Communication with Friends and Family:    • Frequency of Social Gatherings with Friends and Family:    • Attends [employer]al Services:    • Active Member of Clubs or Organizations:    • Attends Club or Organization Meetings:    • Marital Status:    Intimate Partner Violence:    • Fear of Current or Ex-Partner:    • Emotionally Abused:    • Physically Abused:    • Sexually Abused:         Family History   Problem Relation Age of Onset   • Diabetes Maternal Grandfather    • Cancer Mother         ovarian   • Cancer Maternal Grandmother         No Known Allergies    Review of Systems   Constitutional: Negative for fever.   Gastrointestinal: Positive for abdominal pain. Negative for nausea and vomiting.   Genitourinary: Positive for dysuria, flank pain and urgency. Negative for hematuria.   All other systems reviewed and are negative.       Objective:   /74   Pulse 86   Temp 37.1 °C (98.7 °F) (Temporal)   Resp 14   Ht 1.626 m (5' 4\")   Wt 67.1 kg (148 lb)   SpO2 98%   BMI 25.40 kg/m²     Physical Exam  Vitals reviewed.   Constitutional:       General: She is not in acute distress.     Appearance: She is well-developed.   HENT:      Head: Normocephalic and atraumatic.      Right Ear: External ear normal.      Left Ear: External ear normal.      Nose: Nose normal.   Eyes:      Conjunctiva/sclera: Conjunctivae normal.   Cardiovascular:      Rate and Rhythm: Normal rate.   Pulmonary:      Effort: Pulmonary effort is normal.   Abdominal:      General: Bowel sounds are normal. There is no distension.      Palpations: Abdomen is soft.      Tenderness: There is abdominal tenderness in the suprapubic area. There is no right CVA tenderness, left CVA tenderness or guarding.      Comments: Mild   Musculoskeletal:         General: Normal range of motion.      Cervical back: Normal " range of motion.   Skin:     General: Skin is warm and dry.      Findings: No rash.   Neurological:      General: No focal deficit present.      Mental Status: She is alert and oriented to person, place, and time.      GCS: GCS eye subscore is 4. GCS verbal subscore is 5. GCS motor subscore is 6.   Psychiatric:         Mood and Affect: Mood normal.         Speech: Speech normal.         Behavior: Behavior normal.         Thought Content: Thought content normal.         Judgment: Judgment normal.         Assessment/Plan:   1. Acute cystitis with hematuria  - POCT Urinalysis  - POCT Pregnancy  - URINE CULTURE(NEW); Future  - nitrofurantoin (MACROBID) 100 MG Cap; Take 1 capsule by mouth 2 times a day for 5 days.  Dispense: 10 capsule; Refill: 0    Results for orders placed or performed in visit on 10/02/20   POCT Urinalysis   Result Value Ref Range    POC Color YELLOW Negative    POC Appearance CLEAR Negative    POC Leukocyte Esterase SMALL Negative    POC Nitrites POSITIVE Negative    POC Urobiligen 1.0 Negative (0.2) mg/dL    POC Protein NEGATIVE Negative mg/dL    POC Urine PH 7.0 5.0 - 8.0    POC Blood NEGATIVE Negative    POC Specific Gravity 1.020 <1.005 - >1.030    POC Ketones NEGATIVE Negative mg/dL    POC Bilirubin NEGATIVE Negative mg/dL    POC Glucose NEGATIVE Negative mg/dL   -Oral Hydration: Drink plenty of water.  -Take antibiotic as prescribed.  -Follow up with PCP.    Follow up urgently for new or persistent abdominal pain, increased or persistent flank pain, difficulty with urination, fevers, vomiting, weakness, tachycardia, or any other concerns.    Differential diagnosis, natural history, supportive care, and indications for immediate follow-up discussed.

## 2021-05-11 DIAGNOSIS — N39.0 ACUTE URINARY TRACT INFECTION: ICD-10-CM

## 2021-05-11 RX ORDER — AMOXICILLIN 875 MG/1
875 TABLET, COATED ORAL 2 TIMES DAILY
Qty: 10 TABLET | Refills: 0 | Status: SHIPPED | OUTPATIENT
Start: 2021-05-11 | End: 2021-05-16

## 2021-08-18 ENCOUNTER — OFFICE VISIT (OUTPATIENT)
Dept: URGENT CARE | Facility: PHYSICIAN GROUP | Age: 26
End: 2021-08-18
Payer: MEDICAID

## 2021-08-18 VITALS
BODY MASS INDEX: 23.22 KG/M2 | WEIGHT: 136 LBS | RESPIRATION RATE: 16 BRPM | OXYGEN SATURATION: 97 % | TEMPERATURE: 97.2 F | SYSTOLIC BLOOD PRESSURE: 100 MMHG | HEIGHT: 64 IN | HEART RATE: 95 BPM | DIASTOLIC BLOOD PRESSURE: 64 MMHG

## 2021-08-18 DIAGNOSIS — N30.01 ACUTE CYSTITIS WITH HEMATURIA: ICD-10-CM

## 2021-08-18 DIAGNOSIS — R30.0 DYSURIA: ICD-10-CM

## 2021-08-18 LAB
APPEARANCE UR: NORMAL
BILIRUB UR STRIP-MCNC: NORMAL MG/DL
COLOR UR AUTO: NORMAL
GLUCOSE UR STRIP.AUTO-MCNC: 100 MG/DL
INT CON NEG: NORMAL
INT CON POS: NORMAL
KETONES UR STRIP.AUTO-MCNC: 15 MG/DL
LEUKOCYTE ESTERASE UR QL STRIP.AUTO: NORMAL
NITRITE UR QL STRIP.AUTO: POSITIVE
PH UR STRIP.AUTO: 6.5 [PH] (ref 5–8)
POC URINE PREGNANCY TEST: NEGATIVE
PROT UR QL STRIP: 30 MG/DL
RBC UR QL AUTO: NORMAL
SP GR UR STRIP.AUTO: 1.02
UROBILINOGEN UR STRIP-MCNC: 4 MG/DL

## 2021-08-18 PROCEDURE — 81002 URINALYSIS NONAUTO W/O SCOPE: CPT | Performed by: PHYSICIAN ASSISTANT

## 2021-08-18 PROCEDURE — 81025 URINE PREGNANCY TEST: CPT | Performed by: PHYSICIAN ASSISTANT

## 2021-08-18 PROCEDURE — 99214 OFFICE O/P EST MOD 30 MIN: CPT | Mod: 25 | Performed by: PHYSICIAN ASSISTANT

## 2021-08-18 RX ORDER — LEVONORGESTREL AND ETHINYL ESTRADIOL 0.15-0.03
1 KIT ORAL
COMMUNITY
Start: 2021-06-25

## 2021-08-18 RX ORDER — NITROFURANTOIN 25; 75 MG/1; MG/1
100 CAPSULE ORAL EVERY 12 HOURS
Qty: 10 CAPSULE | Refills: 0 | Status: SHIPPED | OUTPATIENT
Start: 2021-08-18 | End: 2021-08-23

## 2021-08-18 ASSESSMENT — ENCOUNTER SYMPTOMS
FLANK PAIN: 0
SWEATS: 0
CHILLS: 0
VOMITING: 0
NAUSEA: 0

## 2021-08-19 NOTE — PROGRESS NOTES
"Subjective:   Jo Ann Vazquez is a 25 y.o. female who presents for Dysuria (Pain, foggy, and frequency, x1week )        Dysuria   This is a new problem. The current episode started in the past 7 days. The problem has been gradually worsening. The quality of the pain is described as aching. The pain is moderate. There has been no fever. She is sexually active. There is a history of pyelonephritis. Associated symptoms include frequency and urgency. Pertinent negatives include no chills, discharge, flank pain, hematuria, hesitancy, nausea, possible pregnancy, sweats or vomiting. She has tried increased fluids (azo) for the symptoms. The treatment provided no relief. Her past medical history is significant for recurrent UTIs.     Review of Systems   Constitutional: Negative for chills.   Gastrointestinal: Negative for nausea and vomiting.   Genitourinary: Positive for dysuria, frequency and urgency. Negative for flank pain, hematuria and hesitancy.       PMH:  has a past medical history of Other psoriasis.  MEDS:   Current Outpatient Medications:   •  KURVELO 0.15-30 MG-MCG per tablet, Take 1 Tablet by mouth., Disp: , Rfl:   ALLERGIES: No Known Allergies  SURGHX:   Past Surgical History:   Procedure Laterality Date   • OTHER      none reported     SOCHX:  reports that she quit smoking about 4 years ago. Her smoking use included cigarettes. She has a 0.30 pack-year smoking history. She has never used smokeless tobacco. She reports that she does not drink alcohol and does not use drugs.  FH: Family history was reviewed, no pertinent findings to report   Objective:   /64   Pulse 95   Temp 36.2 °C (97.2 °F) (Temporal)   Resp 16   Ht 1.626 m (5' 4\")   Wt 61.7 kg (136 lb)   SpO2 97%   BMI 23.34 kg/m²   Physical Exam  Vitals reviewed.   Constitutional:       General: She is not in acute distress.     Appearance: Normal appearance. She is well-developed. She is not toxic-appearing.   HENT:      Head: " Normocephalic and atraumatic.      Right Ear: External ear normal.      Left Ear: External ear normal.      Nose: Nose normal.   Eyes:      General: Gaze aligned appropriately.   Cardiovascular:      Rate and Rhythm: Normal rate and regular rhythm.   Pulmonary:      Effort: Pulmonary effort is normal. No respiratory distress.      Breath sounds: No stridor.   Abdominal:      Tenderness: There is no abdominal tenderness. There is no right CVA tenderness, guarding or rebound.   Musculoskeletal:      Cervical back: Neck supple.   Skin:     General: Skin is warm and dry.      Capillary Refill: Capillary refill takes less than 2 seconds.   Neurological:      Mental Status: She is alert and oriented to person, place, and time.      Comments: CN2-12 grossly intact   Psychiatric:         Speech: Speech normal.         Behavior: Behavior normal.           Results for orders placed or performed in visit on 08/18/21   POCT Urinalysis   Result Value Ref Range    POC Color ORANGE Negative    POC Appearance SLIGHTLY CLOUDY Negative    POC Leukocyte Esterase LARGE Negative    POC Nitrites POSITIVE Negative    POC Urobiligen 4.0 Negative (0.2) mg/dL    POC Protein 30 Negative mg/dL    POC Urine PH 6.5 5.0 - 8.0    POC Blood TRACE-INTACT Negative    POC Specific Gravity 1.020 <1.005 - >1.030    POC Ketones 15 Negative mg/dL    POC Bilirubin SMALL Negative mg/dL    POC Glucose 100 Negative mg/dL   POCT PREGNANCY   Result Value Ref Range    POC Urine Pregnancy Test NEGATIVE Negative    Internal Control Positive Valid     Internal Control Negative Valid        Assessment/Plan:   1. Acute cystitis with hematuria  - nitrofurantoin (MACROBID) 100 MG Cap; Take 1 Capsule by mouth every 12 hours for 5 days.  Dispense: 10 Capsule; Refill: 0    2. Dysuria  - POCT Urinalysis  - POCT PREGNANCY    Other orders  - KURVELO 0.15-30 MG-MCG per tablet; Take 1 Tablet by mouth.    UA and PE consistent with acute cystitis. Pyelonephritis unlikely as pt  has no flank pain, CVA tenderness, N/V, F/C.     Macrobid 100mg, twice a day for 5 days.  Finish entire course of antibiotics even if symptoms resolve earlier.  Take a probiotic or eat Tess’s yogurt or kefir while taking the medication.  Drink 8, 8oz glasses of water every day.  If symptoms fail to improve in 48 hours, worsen or you develop fever, flank pain, nausea, vomiting, or other new symptoms return to the clinic immediately or go the ER.    Differential diagnosis, natural history, supportive care, and indications for immediate follow-up discussed.

## 2021-09-14 ENCOUNTER — NON-PROVIDER VISIT (OUTPATIENT)
Dept: URGENT CARE | Facility: PHYSICIAN GROUP | Age: 26
End: 2021-09-14

## 2021-09-14 DIAGNOSIS — Z02.1 PRE-EMPLOYMENT DRUG SCREENING: ICD-10-CM

## 2021-09-14 LAB
AMP AMPHETAMINE: NORMAL
COC COCAINE: NORMAL
INT CON NEG: NORMAL
INT CON POS: NORMAL
MET METHAMPHETAMINES: NORMAL
OPI OPIATES: NORMAL
PCP PHENCYCLIDINE: NORMAL
POC DRUG COMMENT 753798-OCCUPATIONAL HEALTH: NEGATIVE
THC: NORMAL

## 2021-09-14 PROCEDURE — 80305 DRUG TEST PRSMV DIR OPT OBS: CPT | Performed by: PHYSICIAN ASSISTANT

## 2024-06-08 ENCOUNTER — OFFICE VISIT (OUTPATIENT)
Dept: URGENT CARE | Facility: PHYSICIAN GROUP | Age: 29
End: 2024-06-08
Payer: COMMERCIAL

## 2024-06-08 VITALS
TEMPERATURE: 97.5 F | OXYGEN SATURATION: 99 % | SYSTOLIC BLOOD PRESSURE: 92 MMHG | DIASTOLIC BLOOD PRESSURE: 68 MMHG | WEIGHT: 145 LBS | HEART RATE: 75 BPM | BODY MASS INDEX: 24.75 KG/M2 | HEIGHT: 64 IN | RESPIRATION RATE: 16 BRPM

## 2024-06-08 DIAGNOSIS — B96.89 BACTERIAL SINUSITIS: ICD-10-CM

## 2024-06-08 DIAGNOSIS — J32.9 BACTERIAL SINUSITIS: ICD-10-CM

## 2024-06-08 DIAGNOSIS — H65.191 OTHER NON-RECURRENT ACUTE NONSUPPURATIVE OTITIS MEDIA OF RIGHT EAR: ICD-10-CM

## 2024-06-08 PROCEDURE — 99213 OFFICE O/P EST LOW 20 MIN: CPT

## 2024-06-08 PROCEDURE — 3074F SYST BP LT 130 MM HG: CPT

## 2024-06-08 PROCEDURE — 3078F DIAST BP <80 MM HG: CPT

## 2024-06-08 RX ORDER — AMOXICILLIN AND CLAVULANATE POTASSIUM 875; 125 MG/1; MG/1
1 TABLET, FILM COATED ORAL 2 TIMES DAILY
Qty: 14 TABLET | Refills: 0 | Status: SHIPPED | OUTPATIENT
Start: 2024-06-08 | End: 2024-06-15

## 2024-06-08 NOTE — PROGRESS NOTES
"Chief Complaint   Patient presents with    Sinus Problem     Pt states symptoms started Sunday. Wed/Thursday it got really bad and woke up with plugged ears. Sinuses painful, swollen lymph node, nasal congestion. Constant ringing in right ear.          Subjective:   HISTORY OF PRESENT ILLNESS: Jo Ann Vazquez is a 28 y.o. female who presents for head pressure, right ear pain, sinus pressure for 8 days. She thought she was getting back on Wednesday of this week but then worsened again yesterday    Patient denies fever, SOB or sign cough    Medications, Allergies, current problem list, Social and Family history reviewed today in Epic.     Objective:     BP 92/68 (BP Location: Right arm, Patient Position: Sitting, BP Cuff Size: Adult)   Pulse 75   Temp 36.4 °C (97.5 °F) (Temporal)   Resp 16   Ht 1.626 m (5' 4\")   Wt 65.8 kg (145 lb)   SpO2 99%     Physical Exam  Vitals reviewed.   Constitutional:       General: She is not in acute distress.     Appearance: Normal appearance. She is not ill-appearing.   HENT:      Head: Normocephalic.      Right Ear: No middle ear effusion. No mastoid tenderness. Tympanic membrane is erythematous and bulging. Tympanic membrane is not perforated.      Left Ear: Tympanic membrane normal.  No middle ear effusion. No mastoid tenderness.      Nose: Congestion present.      Right Turbinates: Swollen.      Left Turbinates: Swollen.      Right Sinus: Maxillary sinus tenderness and frontal sinus tenderness present.      Left Sinus: Maxillary sinus tenderness and frontal sinus tenderness present.      Mouth/Throat:      Mouth: Mucous membranes are moist.   Cardiovascular:      Rate and Rhythm: Normal rate.   Pulmonary:      Effort: Pulmonary effort is normal. No respiratory distress.      Breath sounds: Normal breath sounds.   Musculoskeletal:      Cervical back: Full passive range of motion without pain and normal range of motion.   Lymphadenopathy:      Cervical: No cervical " adenopathy.   Skin:     General: Skin is warm and dry.   Neurological:      Mental Status: She is alert and oriented to person, place, and time.   Psychiatric:         Mood and Affect: Mood normal.          Assessment/Plan:     Diagnosis and associated orders    I personally reviewed prior external notes and test results pertinent to today's visit.     1. Bacterial sinusitis  amoxicillin-clavulanate (AUGMENTIN) 875-125 MG Tab      2. Other non-recurrent acute nonsuppurative otitis media of right ear  amoxicillin-clavulanate (AUGMENTIN) 875-125 MG Tab            IMPRESSION:  Pt has stable vital signs and no red flag symptoms or exam findings identified.   Informed pt that symptoms are consistent with bacterial sinusitis given the length of symptoms and double sickening.  Advised to start antibiotics and add Flonase nasal spray and a OTC nasal decongestant.   She also has a right ear infection, will treat with Augmentin. .    Differential diagnosis discussed. Pt was Educated on red flag symptoms. Pt has been Instructed to return to Urgent Care or nearest Emergency Department if symptoms fail to improve, for any change in condition, further concerns, or new concerning symptoms. Patient states understanding of the plan of care and discharge instructions.  They are discharged in stable condition.         Please note that this dictation was created using voice recognition software. I have made a reasonable attempt to correct obvious errors, but I expect that there are errors of grammar and possibly content that I did not discover before finalizing the note.    This note was electronically signed by BERNIE Clemons

## 2024-06-23 ENCOUNTER — OFFICE VISIT (OUTPATIENT)
Dept: URGENT CARE | Facility: PHYSICIAN GROUP | Age: 29
End: 2024-06-23
Payer: COMMERCIAL

## 2024-06-23 VITALS
HEART RATE: 91 BPM | SYSTOLIC BLOOD PRESSURE: 104 MMHG | DIASTOLIC BLOOD PRESSURE: 64 MMHG | BODY MASS INDEX: 24.96 KG/M2 | OXYGEN SATURATION: 97 % | WEIGHT: 146.2 LBS | HEIGHT: 64 IN | TEMPERATURE: 97.1 F | RESPIRATION RATE: 18 BRPM

## 2024-06-23 DIAGNOSIS — B96.89 ACUTE BACTERIAL SINUSITIS: ICD-10-CM

## 2024-06-23 DIAGNOSIS — J01.90 ACUTE BACTERIAL SINUSITIS: ICD-10-CM

## 2024-06-23 PROCEDURE — 99213 OFFICE O/P EST LOW 20 MIN: CPT | Performed by: PHYSICIAN ASSISTANT

## 2024-06-23 PROCEDURE — 3078F DIAST BP <80 MM HG: CPT | Performed by: PHYSICIAN ASSISTANT

## 2024-06-23 PROCEDURE — 3074F SYST BP LT 130 MM HG: CPT | Performed by: PHYSICIAN ASSISTANT

## 2024-06-23 RX ORDER — DOXYCYCLINE HYCLATE 100 MG
100 TABLET ORAL 2 TIMES DAILY
Qty: 14 TABLET | Refills: 0 | Status: SHIPPED | OUTPATIENT
Start: 2024-06-23 | End: 2024-06-30

## 2024-06-23 RX ORDER — METHYLPREDNISOLONE 4 MG/1
4 TABLET ORAL DAILY
Qty: 21 TABLET | Refills: 0 | Status: SHIPPED | OUTPATIENT
Start: 2024-06-23

## 2024-06-23 ASSESSMENT — ENCOUNTER SYMPTOMS
DIZZINESS: 0
HEADACHES: 0
SHORTNESS OF BREATH: 0
DIAPHORESIS: 0
EYE REDNESS: 0
SINUS PAIN: 1
FEVER: 0
SORE THROAT: 0
CONSTIPATION: 0
VOMITING: 0
COUGH: 0
CHILLS: 0
NAUSEA: 0
WHEEZING: 0
ABDOMINAL PAIN: 0
EYE DISCHARGE: 0
EYE PAIN: 0
DIARRHEA: 0

## 2024-06-23 NOTE — PROGRESS NOTES
"  Subjective:     Jo Ann Vazquez  is a 28 y.o. female who presents for Sinusitis (Was seen 6/8 for same thing )       She presents today with sinus pain and pressure that remains ongoing since her office visit on 6/8/2024.  States that while taking the oral Augmentin her symptoms were improving but they began to worsen within the past few days.  Notes upper dental pain occurring yesterday as well as severe sinus pain and pressure.  Notes right-sided ear discomfort.  Denies fever/chills/sweats, chest pain, shortness of breath, nausea/vomiting, abdominal pain, diarrhea.  Has used additional over-the-counter medications for symptom support       Review of Systems   Constitutional:  Negative for chills, diaphoresis, fever and malaise/fatigue.   HENT:  Positive for congestion, ear pain and sinus pain. Negative for ear discharge and sore throat.    Eyes:  Negative for pain, discharge and redness.   Respiratory:  Negative for cough, shortness of breath and wheezing.    Cardiovascular:  Negative for chest pain.   Gastrointestinal:  Negative for abdominal pain, constipation, diarrhea, nausea and vomiting.   Neurological:  Negative for dizziness and headaches.      No Known Allergies  Past Medical History:   Diagnosis Date    Other psoriasis         Objective:   /64 (BP Location: Left arm, Patient Position: Sitting, BP Cuff Size: Small adult)   Pulse 91   Temp 36.2 °C (97.1 °F) (Temporal)   Resp 18   Ht 1.626 m (5' 4\")   Wt 66.3 kg (146 lb 3.2 oz)   SpO2 97%   BMI 25.10 kg/m²   Physical Exam  Vitals and nursing note reviewed.   Constitutional:       General: She is not in acute distress.     Appearance: Normal appearance. She is not ill-appearing, toxic-appearing or diaphoretic.   HENT:      Head: Normocephalic.      Right Ear: Tympanic membrane, ear canal and external ear normal. There is no impacted cerumen.      Left Ear: Tympanic membrane, ear canal and external ear normal. There is no impacted " cerumen.      Nose: Congestion present. No rhinorrhea.      Mouth/Throat:      Mouth: Mucous membranes are moist.      Pharynx: No oropharyngeal exudate or posterior oropharyngeal erythema.   Eyes:      General:         Right eye: No discharge.         Left eye: No discharge.      Conjunctiva/sclera: Conjunctivae normal.   Cardiovascular:      Rate and Rhythm: Normal rate and regular rhythm.   Pulmonary:      Effort: Pulmonary effort is normal. No respiratory distress.      Breath sounds: Normal breath sounds. No stridor. No wheezing or rhonchi.   Musculoskeletal:      Cervical back: Neck supple.   Lymphadenopathy:      Cervical: No cervical adenopathy.   Neurological:      General: No focal deficit present.      Mental Status: She is alert and oriented to person, place, and time.   Psychiatric:         Mood and Affect: Mood normal.         Behavior: Behavior normal.         Thought Content: Thought content normal.         Judgment: Judgment normal.             Diagnostic testing: None    Assessment/Plan:     Encounter Diagnoses   Name Primary?    Acute bacterial sinusitis           Plan for care for today's complaint includes start the patient on doxycycline for acute bacterial sinusitis, also provided Medrol Dosepak for additional sinusitis symptom support.  Patient verbally states she is not currently lactating at this time.   continue additional over-the-counter medications for symptom relief vital signs were stable during today's office visit, patient was overall well-appearing. Continue to monitor symptoms and return to urgent care or follow-up with primary care provider if symptoms remain ongoing.  Follow-up in the emergency department if symptoms become severe, ER precautions discussed in office today..  Prescription for doxycycline, Medrol Dosepak provided.    See AVS Instructions below for written guidance provided to patient on after-visit management and care in addition to our verbal discussion during  the visit.    Please note that this dictation was created using voice recognition software. I have attempted to correct all errors, but there may be sound-alike, spelling, grammar and possibly content errors that I did not discover before finalizing the note.    Eduard Key PA-C

## 2024-07-04 ENCOUNTER — SUPERVISING PHYSICIAN REVIEW (OUTPATIENT)
Dept: URGENT CARE | Facility: PHYSICIAN GROUP | Age: 29
End: 2024-07-04
Payer: COMMERCIAL

## 2025-01-21 ENCOUNTER — OFFICE VISIT (OUTPATIENT)
Dept: MEDICAL GROUP | Facility: PHYSICIAN GROUP | Age: 30
End: 2025-01-21
Payer: COMMERCIAL

## 2025-01-21 VITALS
HEART RATE: 97 BPM | WEIGHT: 138 LBS | OXYGEN SATURATION: 100 % | TEMPERATURE: 97 F | HEIGHT: 64 IN | RESPIRATION RATE: 16 BRPM | DIASTOLIC BLOOD PRESSURE: 80 MMHG | BODY MASS INDEX: 23.56 KG/M2 | SYSTOLIC BLOOD PRESSURE: 118 MMHG

## 2025-01-21 DIAGNOSIS — N80.9 ENDOMETRIOSIS: ICD-10-CM

## 2025-01-21 DIAGNOSIS — G43.109 MIGRAINE WITH AURA AND WITHOUT STATUS MIGRAINOSUS, NOT INTRACTABLE: ICD-10-CM

## 2025-01-21 PROBLEM — R30.0 DYSURIA: Status: RESOLVED | Noted: 2020-04-08 | Resolved: 2025-01-21

## 2025-01-21 PROBLEM — Z00.00 HEALTHCARE MAINTENANCE: Status: RESOLVED | Noted: 2017-03-10 | Resolved: 2025-01-21

## 2025-01-21 PROCEDURE — 99214 OFFICE O/P EST MOD 30 MIN: CPT

## 2025-01-21 RX ORDER — SUMATRIPTAN 50 MG/1
50 TABLET, FILM COATED ORAL
Qty: 10 TABLET | Refills: 3 | Status: SHIPPED | OUTPATIENT
Start: 2025-01-21

## 2025-01-21 RX ORDER — LEVONORGESTREL AND ETHINYL ESTRADIOL 0.15-0.03
1 KIT ORAL DAILY
Qty: 28 TABLET | Refills: 11 | Status: SHIPPED | OUTPATIENT
Start: 2025-01-21

## 2025-01-21 ASSESSMENT — ENCOUNTER SYMPTOMS
INSOMNIA: 0
CONSTIPATION: 0
DIARRHEA: 0
DEPRESSION: 0
COUGH: 0
HEADACHES: 0
WEIGHT LOSS: 0
BACK PAIN: 0
HEARTBURN: 0

## 2025-01-21 ASSESSMENT — PATIENT HEALTH QUESTIONNAIRE - PHQ9: CLINICAL INTERPRETATION OF PHQ2 SCORE: 0

## 2025-01-21 NOTE — ASSESSMENT & PLAN NOTE
-Chronic, ongoing and unimproved.  Patient was on OCPs around 3 years ago which she said was very helpful, prolonged her symptoms.  There has been no change in the status or level of her symptoms.  -Patient desiring to restart OCP use to aid in controlling her symptoms.  I discussed at length with patient other options including hormonal IUD and subdermal implant.  Despite discussion of the risks related to her new onset of migraines, patient preferring OCPs with shared decision making.  -Rx sent for Kurvelo combined oral contraceptive pill.

## 2025-01-21 NOTE — PROGRESS NOTES
Subjective:     CC:  Diagnoses of Tobacco use disorder, Endometriosis, and Migraine with aura and without status migrainosus, not intractable were pertinent to this visit.    HISTORY OF THE PRESENT ILLNESS: Patient is a 29 y.o. female. This is a very pleasant patient here today to establish care and discuss migraines.     Problem   Migraine With Aura and Without Status Migrainosus, Not Intractable    Over the last year and a half. Has noticed an increase in intensity and frequency especially over the last 3 month. Has identified stress as a trigger.  Ibuprofen and Excedrin Migraine only mildly helpful.  Patient is interested in being worked up for this as well as possible medications.     Endometriosis    Birth control was helpful to her symptoms, however patient stopped birth control around 3 years ago and has just been dealing with it as it comes.  GYN history:  -   - Menstrual cycles are every 28 days   - Age at menarche was 13  - Bleeds about 5-6 days, 2 days of heavy flow.  -Patient does have a history of an abnormal Pap (ASCUS, HPV negative ) when she was around 22 years old, Pap smear at 25 years old was normal    Patient has received counseling different birth control options including IUD and Nexplanon.  Currently, patient preferrs OCP for menstrual issues, despite the risks that were discussed with OCPs and migraines.     Tobacco Use Disorder    Vapping, has done this for the last 2 years or so     Other Psoriasis    Diagnosed >10 years ago. Primarily worse in the winter and largely located in webbing of fingers and ears. Lotion, cervae, work well to help with her symptoms.     Dysuria (Resolved)   Postpartum Care Following Vaginal Delivery (Resolved)   Healthcare Maintenance (Resolved)       Health Maintenance: Completed    ROS:   Review of Systems   Constitutional:  Negative for weight loss.   Respiratory:  Negative for cough.    Cardiovascular:  Negative for leg swelling.   Gastrointestinal:   "Negative for constipation, diarrhea and heartburn.   Genitourinary:  Negative for dysuria, frequency and urgency.   Musculoskeletal:  Negative for back pain.   Neurological:  Negative for headaches.   Psychiatric/Behavioral:  Negative for depression. The patient does not have insomnia.          Objective:       Exam: /80   Pulse 97   Temp 36.1 °C (97 °F) (Temporal)   Resp 16   Ht 1.626 m (5' 4\")   Wt 62.6 kg (138 lb)   SpO2 100%  Body mass index is 23.69 kg/m².    Physical Exam  Constitutional:       General: She is not in acute distress.     Appearance: Normal appearance. She is not ill-appearing.   HENT:      Head: Normocephalic and atraumatic.      Right Ear: Tympanic membrane normal. There is no impacted cerumen.      Left Ear: Tympanic membrane normal. There is no impacted cerumen.      Nose: Nose normal.      Mouth/Throat:      Mouth: Mucous membranes are moist.      Pharynx: Oropharynx is clear. No posterior oropharyngeal erythema.   Eyes:      Extraocular Movements: Extraocular movements intact.      Conjunctiva/sclera: Conjunctivae normal.      Pupils: Pupils are equal, round, and reactive to light.   Cardiovascular:      Rate and Rhythm: Normal rate and regular rhythm.      Heart sounds: No murmur heard.  Pulmonary:      Effort: Pulmonary effort is normal.      Breath sounds: Normal breath sounds.   Abdominal:      General: Abdomen is flat. There is no distension.      Palpations: Abdomen is soft.      Tenderness: There is no abdominal tenderness.   Musculoskeletal:         General: Normal range of motion.      Cervical back: Normal range of motion.   Lymphadenopathy:      Cervical: No cervical adenopathy.   Skin:     General: Skin is warm and dry.      Capillary Refill: Capillary refill takes less than 2 seconds.   Neurological:      General: No focal deficit present.      Mental Status: She is alert and oriented to person, place, and time.      Cranial Nerves: Cranial nerves 2-12 are intact. "      Motor: Motor function is intact.      Coordination: Coordination is intact.      Deep Tendon Reflexes: Reflexes normal.   Psychiatric:         Mood and Affect: Mood normal.             Assessment & Plan:   29 y.o. female with the following -    Assessment & Plan  Endometriosis  -Chronic, ongoing and unimproved.  Patient was on OCPs around 3 years ago which she said was very helpful, prolonged her symptoms.  There has been no change in the status or level of her symptoms.  -Patient desiring to restart OCP use to aid in controlling her symptoms.  I discussed at length with patient other options including hormonal IUD and subdermal implant.  Despite discussion of the risks related to her new onset of migraines, patient preferring OCPs with shared decision making.  -Rx sent for Kurvelo combined oral contraceptive pill.       Migraine with aura and without status migrainosus, not intractable  -Subchronic, with acute worsening of symptoms including frequency and intensity.  -Rx sent for Imitrex 50 mg to use as needed for acute onset migraines.  -MRI ordered of the brain given characteristics described as patient's as worsening and increasing frequency of migraines  -Event laboratory work ordered today includes CBC, CMP, TSH, vitamin D, HIV screen, and hepatitis C screen  Orders:    MR-BRAIN-WITH & W/O; Future    CBC WITH DIFFERENTIAL; Future    Comp Metabolic Panel; Future    TSH WITH REFLEX TO FT4; Future    VITAMIN D,25 HYDROXY (DEFICIENCY); Future    HIV AG/AB COMBO ASSAY SCREENING; Future    HEP C VIRUS ANTIBODY; Future          Return in about 1 month (around 2/21/2025) for f/u pap smear, 2 months for Migraine f/u w/ OMT.    Please note that this dictation was created using voice recognition software. I have made every reasonable attempt to correct obvious errors, but I expect that there are errors of grammar and possibly content that I did not discover before finalizing the note.        Osman Null D.O.

## 2025-01-21 NOTE — ASSESSMENT & PLAN NOTE
-Subchronic, with acute worsening of symptoms including frequency and intensity.  -Rx sent for Imitrex 50 mg to use as needed for acute onset migraines.  -MRI ordered of the brain given characteristics described as patient's as worsening and increasing frequency of migraines  -Event laboratory work ordered today includes CBC, CMP, TSH, vitamin D, HIV screen, and hepatitis C screen  Orders:    MR-BRAIN-WITH & W/O; Future    CBC WITH DIFFERENTIAL; Future    Comp Metabolic Panel; Future    TSH WITH REFLEX TO FT4; Future    VITAMIN D,25 HYDROXY (DEFICIENCY); Future    HIV AG/AB COMBO ASSAY SCREENING; Future    HEP C VIRUS ANTIBODY; Future

## 2025-01-27 ENCOUNTER — HOSPITAL ENCOUNTER (OUTPATIENT)
Dept: LAB | Facility: MEDICAL CENTER | Age: 30
End: 2025-01-27
Payer: COMMERCIAL

## 2025-01-27 DIAGNOSIS — G43.109 MIGRAINE WITH AURA AND WITHOUT STATUS MIGRAINOSUS, NOT INTRACTABLE: ICD-10-CM

## 2025-01-27 LAB
25(OH)D3 SERPL-MCNC: 21 NG/ML (ref 30–100)
ALBUMIN SERPL BCP-MCNC: 4.7 G/DL (ref 3.2–4.9)
ALBUMIN/GLOB SERPL: 1.6 G/DL
ALP SERPL-CCNC: 67 U/L (ref 30–99)
ALT SERPL-CCNC: 10 U/L (ref 2–50)
ANION GAP SERPL CALC-SCNC: 13 MMOL/L (ref 7–16)
AST SERPL-CCNC: 17 U/L (ref 12–45)
BASOPHILS # BLD AUTO: 1.8 % (ref 0–1.8)
BASOPHILS # BLD: 0.1 K/UL (ref 0–0.12)
BILIRUB SERPL-MCNC: 0.5 MG/DL (ref 0.1–1.5)
BUN SERPL-MCNC: 8 MG/DL (ref 8–22)
CALCIUM ALBUM COR SERPL-MCNC: 8.8 MG/DL (ref 8.5–10.5)
CALCIUM SERPL-MCNC: 9.4 MG/DL (ref 8.5–10.5)
CHLORIDE SERPL-SCNC: 103 MMOL/L (ref 96–112)
CO2 SERPL-SCNC: 23 MMOL/L (ref 20–33)
CREAT SERPL-MCNC: 0.76 MG/DL (ref 0.5–1.4)
EOSINOPHIL # BLD AUTO: 0.21 K/UL (ref 0–0.51)
EOSINOPHIL NFR BLD: 3.8 % (ref 0–6.9)
ERYTHROCYTE [DISTWIDTH] IN BLOOD BY AUTOMATED COUNT: 40 FL (ref 35.9–50)
GFR SERPLBLD CREATININE-BSD FMLA CKD-EPI: 108 ML/MIN/1.73 M 2
GLOBULIN SER CALC-MCNC: 2.9 G/DL (ref 1.9–3.5)
GLUCOSE SERPL-MCNC: 91 MG/DL (ref 65–99)
HCT VFR BLD AUTO: 43.7 % (ref 37–47)
HCV AB SER QL: NORMAL
HGB BLD-MCNC: 15.2 G/DL (ref 12–16)
HIV 1+2 AB+HIV1 P24 AG SERPL QL IA: NORMAL
IMM GRANULOCYTES # BLD AUTO: 0.01 K/UL (ref 0–0.11)
IMM GRANULOCYTES NFR BLD AUTO: 0.2 % (ref 0–0.9)
LYMPHOCYTES # BLD AUTO: 2.14 K/UL (ref 1–4.8)
LYMPHOCYTES NFR BLD: 38.6 % (ref 22–41)
MCH RBC QN AUTO: 31.6 PG (ref 27–33)
MCHC RBC AUTO-ENTMCNC: 34.8 G/DL (ref 32.2–35.5)
MCV RBC AUTO: 90.9 FL (ref 81.4–97.8)
MONOCYTES # BLD AUTO: 0.26 K/UL (ref 0–0.85)
MONOCYTES NFR BLD AUTO: 4.7 % (ref 0–13.4)
NEUTROPHILS # BLD AUTO: 2.83 K/UL (ref 1.82–7.42)
NEUTROPHILS NFR BLD: 50.9 % (ref 44–72)
NRBC # BLD AUTO: 0 K/UL
NRBC BLD-RTO: 0 /100 WBC (ref 0–0.2)
PLATELET # BLD AUTO: 208 K/UL (ref 164–446)
PMV BLD AUTO: 10.7 FL (ref 9–12.9)
POTASSIUM SERPL-SCNC: 3.9 MMOL/L (ref 3.6–5.5)
PROT SERPL-MCNC: 7.6 G/DL (ref 6–8.2)
RBC # BLD AUTO: 4.81 M/UL (ref 4.2–5.4)
SODIUM SERPL-SCNC: 139 MMOL/L (ref 135–145)
TSH SERPL DL<=0.005 MIU/L-ACNC: 1.36 UIU/ML (ref 0.38–5.33)
WBC # BLD AUTO: 5.6 K/UL (ref 4.8–10.8)

## 2025-01-27 PROCEDURE — 82306 VITAMIN D 25 HYDROXY: CPT

## 2025-01-27 PROCEDURE — 36415 COLL VENOUS BLD VENIPUNCTURE: CPT

## 2025-01-27 PROCEDURE — 84443 ASSAY THYROID STIM HORMONE: CPT

## 2025-01-27 PROCEDURE — 87389 HIV-1 AG W/HIV-1&-2 AB AG IA: CPT

## 2025-01-27 PROCEDURE — 86803 HEPATITIS C AB TEST: CPT

## 2025-01-27 PROCEDURE — 80053 COMPREHEN METABOLIC PANEL: CPT

## 2025-01-27 PROCEDURE — 85025 COMPLETE CBC W/AUTO DIFF WBC: CPT

## 2025-03-15 ENCOUNTER — HOSPITAL ENCOUNTER (OUTPATIENT)
Dept: RADIOLOGY | Facility: MEDICAL CENTER | Age: 30
End: 2025-03-15
Payer: COMMERCIAL

## 2025-03-15 DIAGNOSIS — G43.109 MIGRAINE WITH AURA AND WITHOUT STATUS MIGRAINOSUS, NOT INTRACTABLE: ICD-10-CM

## 2025-03-15 PROCEDURE — 70553 MRI BRAIN STEM W/O & W/DYE: CPT

## 2025-03-15 PROCEDURE — 700117 HCHG RX CONTRAST REV CODE 255: Mod: JZ

## 2025-03-15 PROCEDURE — A9579 GAD-BASE MR CONTRAST NOS,1ML: HCPCS | Mod: JZ

## 2025-03-15 RX ADMIN — GADOTERIDOL 13 ML: 279.3 INJECTION, SOLUTION INTRAVENOUS at 14:21

## 2025-03-18 ENCOUNTER — RESULTS FOLLOW-UP (OUTPATIENT)
Dept: MEDICAL GROUP | Facility: PHYSICIAN GROUP | Age: 30
End: 2025-03-18

## 2025-03-31 ENCOUNTER — HOSPITAL ENCOUNTER (OUTPATIENT)
Facility: MEDICAL CENTER | Age: 30
End: 2025-03-31
Payer: COMMERCIAL

## 2025-03-31 ENCOUNTER — OFFICE VISIT (OUTPATIENT)
Dept: MEDICAL GROUP | Facility: PHYSICIAN GROUP | Age: 30
End: 2025-03-31
Payer: COMMERCIAL

## 2025-03-31 VITALS
BODY MASS INDEX: 24.11 KG/M2 | WEIGHT: 141.2 LBS | HEART RATE: 97 BPM | HEIGHT: 64 IN | DIASTOLIC BLOOD PRESSURE: 78 MMHG | OXYGEN SATURATION: 100 % | SYSTOLIC BLOOD PRESSURE: 124 MMHG | RESPIRATION RATE: 16 BRPM | TEMPERATURE: 98.3 F

## 2025-03-31 DIAGNOSIS — Z12.4 SCREENING FOR CERVICAL CANCER: ICD-10-CM

## 2025-03-31 PROCEDURE — 88142 CYTOPATH C/V THIN LAYER: CPT

## 2025-03-31 PROCEDURE — 87624 HPV HI-RISK TYP POOLED RSLT: CPT

## 2025-03-31 PROCEDURE — 99395 PREV VISIT EST AGE 18-39: CPT

## 2025-03-31 PROCEDURE — 3074F SYST BP LT 130 MM HG: CPT

## 2025-03-31 PROCEDURE — 3078F DIAST BP <80 MM HG: CPT

## 2025-03-31 ASSESSMENT — FIBROSIS 4 INDEX: FIB4 SCORE: 0.78

## 2025-03-31 NOTE — PROGRESS NOTES
[facility]  Ambulatory Surgery Unit  Pre-operative Instructions    Procedure Date  Wednesday August 17th            Tentative Arrival Time 0930      1. On the day of your procedure, please report to the Ambulatory Surgery Unit Registration Desk and sign in at your designated time. The Ambulatory Surgery Unit is located in [facility] on the [facility] side of the [facility] across from the [address] [place] [address]. Please have all of your health insurance cards, copayment, and a photo ID.    **TWO adults may accompany you the day of the procedure. We have limited seating available. If our waiting room is at capacity, your ride may be asked to remain in their vehicle. No one under 15 is allowed in the waiting room. Masks, fully covering the mouth and nose, are required in the waiting room. 2. You must have someone with you to drive you home as directed by your surgeon. 3. You may have a light breakfast and take normal morning medications. 4. We recommend you do not drink any alcoholic beverages for 24 hours before and after your procedure. 5. Contact your surgeons office for instructions on the following medications: non-steroidal anti-inflammatory drugs (i.e. Advil, Aleve), vitamins, and supplements. (Some surgeons will want you to stop these medications prior to surgery and others may allow you to take them)   **If you are currently taking Plavix, Coumadin, Aspirin and/or other blood-thinning agents, contact your surgeon for instructions. ** Your surgeon will partner with the physician prescribing these medications to determine if it is safe to stop or if you need to continue taking. Please do not stop taking these medications without instructions from your surgeon. 6. In an effort to help prevent surgical site infection, we ask that you shower with an anti-bacterial soap (i.e. Dial or Safeguard) on the morning of your procedure.  Do not apply any lotions, powders, or deodorants Subjective:     CC:   Chief Complaint   Patient presents with    Gynecologic Exam       HPI:   Jo Ann Vazquez is a 29 y.o. female who presents for annual exam    Patient has GYN provider: No   Last Pap Smear: 5 years ago   H/O Abnormal Pap: No  Last Mammogram: N/A  Last Bone Density Test: N/A  Last Colorectal Cancer Screening: N/A  Last Tdap: 2019  Received HPV series: Yes    Exercise: moderate regular exercise, aerobic < 3 days a week  Diet: Mostly healthy home cooked meals      No LMP recorded.  Hx STDs: No  Birth control: OCP, Kurvelo  Menses every month with 28 days with moderate bleeding.  Reports mild cramping and does take OTC analgesics for cramps.  No significant bloating/fluid retention, pelvic pain, or dyspareunia. No abnormal vaginal discharge.  No breast tenderness, mass, nipple discharge, changes in size or contour, or abnormal cyclic discomfort.    OB History    Para Term  AB Living   1 1 1 0 0 1   SAB IAB Ectopic Molar Multiple Live Births   0 0 0 0 0 1      She  reports being sexually active and has had partner(s) who are male. She reports using the following method of birth control/protection: Pill.    She  has a past medical history of Dysuria (2020), Healthcare maintenance (03/10/2017), Other psoriasis, and Postpartum care following vaginal delivery (2019).  She  has a past surgical history that includes other.    Family History   Problem Relation Age of Onset    Cancer Mother         ovarian    Cancer Maternal Grandmother         breast    Diabetes Maternal Grandfather      Social History     Tobacco Use    Smoking status: Former     Current packs/day: 0.00     Average packs/day: 0.2 packs/day for 2.0 years (0.3 ttl pk-yrs)     Types: Cigarettes     Start date: 2015     Quit date: 2017     Years since quittin.8    Smokeless tobacco: Never   Vaping Use    Vaping status: Some Days    Substances: Nicotine   Substance Use Topics    Alcohol use: No      "Alcohol/week: 0.0 oz    Drug use: No       Patient Active Problem List    Diagnosis Date Noted    Migraine with aura and without status migrainosus, not intractable 01/21/2025    Well woman exam with routine gynecological exam 08/03/2020    Endometriosis 08/03/2020    Tobacco use disorder 03/10/2017    Other psoriasis 09/16/2009     Current Outpatient Medications   Medication Sig Dispense Refill    KURVELO 0.15-30 MG-MCG per tablet Take 1 Tablet by mouth every day. 28 Tablet 11    SUMAtriptan (IMITREX) 50 MG Tab Take 1 Tablet by mouth one time as needed for Migraine for up to 1 dose. 10 Tablet 3    methylPREDNISolone (MEDROL DOSEPAK) 4 MG Tablet Therapy Pack Take 1 Tablet by mouth every day. Follow schedule on package instructions. (Patient not taking: Reported on 3/31/2025) 21 Tablet 0     No current facility-administered medications for this visit.     No Known Allergies    Review of Systems   Constitutional: Negative for fever, chills and malaise/fatigue.   HENT: Negative for congestion.    Eyes: Negative for pain.   Respiratory: Negative for cough and shortness of breath.    Cardiovascular: Negative for chest pain and leg swelling.   Gastrointestinal: Negative for nausea, vomiting, abdominal pain and diarrhea.   Genitourinary: Negative for dysuria and hematuria.   Skin: Negative for rash.   Neurological: Negative for dizziness, focal weakness and headaches.   Endo/Heme/Allergies: Does not bruise/bleed easily.   Psychiatric/Behavioral: Negative for depression.  The patient is not nervous/anxious.      Objective:   /78   Pulse 97   Temp 36.8 °C (98.3 °F) (Temporal)   Resp 16   Ht 1.626 m (5' 4\")   Wt 64 kg (141 lb 3.2 oz)   SpO2 100%   BMI 24.24 kg/m²     Wt Readings from Last 4 Encounters:   03/31/25 64 kg (141 lb 3.2 oz)   01/21/25 62.6 kg (138 lb)   06/23/24 66.3 kg (146 lb 3.2 oz)   06/08/24 65.8 kg (145 lb)       {Chaperone Offered? (Optional):17954}    Physical Exam:  Constitutional: " after showering. 7. Wear comfortable clothes. Wear glasses instead of contacts. Do not bring any jewelry or money (other than copays or fees as instructed). Do not wear make-up, particularly mascara, the morning of your procedure. Wear your hair loose or down, no ponytails, buns, smitha pins or clips. All body piercings must be removed. 8. You should understand that if you do not follow these instructions your procedure may be cancelled. If your physical condition changes (i.e. fever, cold or flu) please contact your surgeon as soon as possible. 9. It is important that you be on time. If a situation occurs where you may be late, or if you have any questions or problems, please call [phone].    10. Your procedure time may be subject to change. You will receive a phone call the day prior to confirm your arrival time. I understand a pre-operative phone call will be made to verify my procedure time. In the event that I am not available, I give permission for a message to be left on my answering service and/or with another person?       Yes    Reviewed instructions via telephone, patient verbalized understanding         ___________________      ___________________      ___________________  (Signature of Patient)          (Witness)                   (Date and Time) Well-developed and well-nourished. Not diaphoretic. No distress.   Skin: Skin is warm and dry. No rash noted.  Head: Atraumatic without lesions.  Eyes: Conjunctivae and extraocular motions are normal. Pupils are equal, round, and reactive to light. No scleral icterus.   Ears:  External ears unremarkable. Tympanic membranes clear and intact.  Nose: Nares patent. Septum midline. Turbinates without erythema nor edema. No discharge.   Mouth/Throat: Tongue normal. Oropharynx is clear and moist. Posterior pharynx without erythema or exudates.  Neck: Supple, trachea midline. Normal range of motion. No thyromegaly present. No lymphadenopathy--cervical or supraclavicular.  Cardiovascular: Regular rate and rhythm, S1 and S2 without murmur, rubs, or gallops.    Respiratory: Effort normal. Clear to auscultation throughout. No adventitious sounds.   {BREAST EXAM (Optional):41569}  Abdomen: Soft, non tender, and without distention. Active bowel sounds in all four quadrants. No rebound, guarding, masses or HSM.  : Perineum and external genitalia normal without rash. Vagina with normal and physiologic discharge. Cervix without visible lesions or discharge. Bimanual exam without adnexal masses or cervical motion tenderness.  Extremities: No cyanosis, clubbing, erythema, nor edema. Distal pulses intact and symmetric.   Musculoskeletal: All major joints AROM full in all directions without pain.  Neurological: Alert and oriented x 3. Grossly non-focal. Strength and sensation grossly intact. DTRs 2+/3 and symmetric.   Psychiatric:  Behavior, mood, and affect are appropriate.    Assessment and Plan:     There are no diagnoses linked to this encounter.    Health maintenance:    Labs per orders  Immunizations per orders  Patient counseled about skin care, diet, supplements, and exercise.  Discussed  breast self exam, use and side effects of OCP's, family planning choices, adequate intake of calcium and vitamin D, diet and exercise      Follow-up: No follow-ups on file.

## 2025-04-07 LAB
HPV I/H RISK 1 DNA SPEC QL NAA+PROBE: NOT DETECTED
SPECIMEN SOURCE: NORMAL
THINPREP PAP, CYTOLOGY NL11781: NORMAL

## 2025-04-18 ENCOUNTER — RESULTS FOLLOW-UP (OUTPATIENT)
Dept: MEDICAL GROUP | Facility: PHYSICIAN GROUP | Age: 30
End: 2025-04-18

## 2025-04-21 ENCOUNTER — OFFICE VISIT (OUTPATIENT)
Dept: MEDICAL GROUP | Facility: PHYSICIAN GROUP | Age: 30
End: 2025-04-21
Payer: COMMERCIAL

## 2025-04-21 VITALS
OXYGEN SATURATION: 99 % | TEMPERATURE: 97 F | HEART RATE: 81 BPM | SYSTOLIC BLOOD PRESSURE: 118 MMHG | HEIGHT: 64 IN | DIASTOLIC BLOOD PRESSURE: 84 MMHG | BODY MASS INDEX: 23.32 KG/M2 | RESPIRATION RATE: 14 BRPM | WEIGHT: 136.6 LBS

## 2025-04-21 DIAGNOSIS — M99.00 SOMATIC DYSFUNCTION OF OCCIPITOCERVICAL REGION: ICD-10-CM

## 2025-04-21 PROCEDURE — 98925 OSTEOPATH MANJ 1-2 REGIONS: CPT

## 2025-04-21 PROCEDURE — 3079F DIAST BP 80-89 MM HG: CPT

## 2025-04-21 PROCEDURE — 3074F SYST BP LT 130 MM HG: CPT

## 2025-04-21 ASSESSMENT — FIBROSIS 4 INDEX: FIB4 SCORE: 0.78

## 2025-04-22 PROBLEM — M99.00 SOMATIC DYSFUNCTION OF OCCIPITOCERVICAL REGION: Status: ACTIVE | Noted: 2025-04-22

## 2025-04-22 NOTE — PROCEDURES
Osteopathic Manipulative Treatment (OMT) was performed as follows:       1. Suboccipital Release:     - The patient was placed in a supine position with the head supported.       - Gentle pressure was applied with fingertips to the suboccipital musculature bilaterally, maintaining sustained traction until a release of tension was palpated (approximately 2-3 minutes).       - Post-treatment, improved tissue texture and decreased tenderness were noted in the suboccipital region.       2. Soft Tissue Technique:      - Kneading and stretching were applied to the paraspinal muscles of the cervical spine and upper trapezius bilaterally.       - Moderate resistance was encountered initially, followed by relaxation of tissues after approximately 3-4 minutes of treatment.       - Focus was placed on relieving hypertonicity and improving circulation to the affected areas.       3. Muscle Energy Technique (MET):       - Targeted somatic dysfunction at the OA junction and C1-C2.       - Patient was instructed to gently resist against my hand pressure in specific vectors (e.g., flexion and rotation) for 3-5 seconds, followed by relaxation. This was repeated for 3 cycles.       - Post-isometric relaxation resulted in improved range of motion and symmetry at the OA junction and cervical segments.       Post-Procedure Findings:   - Improved cervical range of motion in all planes, with decreased tenderness and muscle tension in the suboccipital and trapezius regions.    - No adverse reactions noted during or after the procedure.       Plan:    - The patient was advised to maintain proper posture and perform gentle neck stretches daily as demonstrated.    - Follow-up in 1 month to reassess symptoms and determine if additional OMT sessions are indicated.    - Return sooner if headaches worsen or new symptoms arise.

## 2025-04-22 NOTE — PROGRESS NOTES
Verbal consent was acquired by the patient to use [employer] ambient listening note generation during this visit     Subjective:     HPI:   History of Present Illness  The patient presents for osteopathic manipulative treatment related to her headaches. She is accompanied by her roommate.    Headaches  The chief complaint includes a combination of migraines and tension-type headaches, with the latter occurring daily. A prescribed medication regimen has been followed for approximately 1.5 months, during which 6 out of the 9 pills have been utilized, indicating the efficacy of the treatment. Prior to the initiation of this medication, an escalation in both the intensity and frequency of migraines was reported.  - Onset: Prior to the initiation of the medication regimen.  - Duration: Daily tension-type headaches; migraines with increased intensity and frequency before medication.  - Character: Combination of migraines and tension-type headaches.  - Alleviating Factors: Prescribed medication regimen followed for approximately 1.5 months.  - Severity: Effective treatment indicated by the utilization of 6 out of 9 pills.    Shoulder Injury  A history of a shoulder injury is noted, which was not medically evaluated at the time of occurrence. The injury was exacerbated during a work-related incident involving a sudden upward jolt to the arm while holding onto a door string. Persistent discomfort has been experienced, particularly when the arm is positioned around her child's head. Uncertainty exists regarding whether the shoulder injury is contributing to the current symptoms. Additionally, others have informed her that her head tends to tilt to the right when attempting to sit upright, a symptom she is unsure if it is related to the shoulder injury.  - Onset: Exacerbated during a work-related incident.  - Location: Shoulder.  - Character: Persistent discomfort, head tilting to the right.  - Timing: Persistent discomfort  "when the arm is positioned around her child's head.  - Severity: Uncertainty regarding contribution to current symptoms.      Objective:     Exam:  /84   Pulse 81   Temp 36.1 °C (97 °F) (Temporal)   Resp 14   Ht 1.626 m (5' 4\")   Wt 62 kg (136 lb 9.6 oz)   SpO2 99%   BMI 23.45 kg/m²  Body mass index is 23.45 kg/m².    Physical Exam  Constitutional:       General: She is not in acute distress.     Appearance: Normal appearance. She is not ill-appearing.   Eyes:      Conjunctiva/sclera: Conjunctivae normal.   Cardiovascular:      Rate and Rhythm: Normal rate and regular rhythm.      Heart sounds: No murmur heard.  Pulmonary:      Effort: Pulmonary effort is normal. No respiratory distress.      Breath sounds: Normal breath sounds. No wheezing or rhonchi.   Skin:     General: Skin is warm and dry.      Capillary Refill: Capillary refill takes less than 2 seconds.   Neurological:      General: No focal deficit present.      Mental Status: She is alert and oriented to person, place, and time.   Psychiatric:         Mood and Affect: Mood normal.       Osteopathic Objective Findings  TART Exam:  -Tenderness elicited over the right suboccipital triangle and right C1 transverse process.    -Tissue texture changes: Hypertonicity and ropiness in the left rectus capitis posterior major and obliquus capitis inferior. Mild warmth noted locally.    -Asymmetry: Occiput appears side-bent left relative to C1; C1 transverse process prominent on the right.      Range of Motion:    -Active: Cervical rotation restricted to 60° on the left (normal ~80°), 75° on the right. Flexion and extension within normal limits but stiff at end-range. Side-bending mildly limited to the right.    -Passive: Left rotation barrier noted at C1-C2 with a firm end-feel; occiput resists right side-bending.      Osteopathic Structural Exam:  - Occiput on C1: Side-bent right, rotated Left (SRRL).    - C1 on C2: Rotated left, side-bent right (RLSR), " with restricted left rotation.    - TART findings: Tenderness (left suboccipital region), Asymmetry ([id number] alignment),     Restriction (left rotation at C1-C2), Tissue texture changes (left suboccipital hypertonicity).      Neurological: Cranial nerves II-XII intact. Upper limb reflexes 2+ bilaterally. Sensation intact to light touch in occipital and cervical dermatomes.    - Cervical compression test negative for radicular symptoms.    - Suboccipital palpation reproduces localized pain without referral.           Results  Labs   - Pap smear: Normal    Assessment & Plan:     No diagnosis found.    Assessment & Plan  1. Headache: Chronic.  - Presents with a combination of migraines and tension-type headaches, with daily headaches and less frequent but intense migraines.  - Osteopathic manipulative treatment (OMT) was performed, targeting the muscles at the base of the skull and cervical spine.  - See procedure note for details  - Reported slight improvement in headache following the treatment.  - Advised to bring her roommate to the next appointment to learn OMT techniques for home application, allowing for more frequent treatments.  - Informed to contact the pharmacy for a refill of migraine medication, as three refills were previously ordered.    2. Shoulder injury.  - Reported a past shoulder injury that was not formally assessed or treated, potentially leading to muscle imbalances and compensatory behaviors contributing to headaches.  - OMT was performed to help retrain the muscles and restore natural balance.  - Advised to continue monitoring the shoulder and report any persistent or worsening symptoms.    3. Health maintenance.  - Recent Pap smear results were normal.  - Informed that a Pap smear is needed once every five years unless otherwise indicated.    Follow-up  - Schedule another appointment for the roommate to join and learn OMT techniques.    PROCEDURE  Procedure: Osteopathic Manipulative  Treatment (OMT) for headache management    All questions were answered and agreement to proceed was given after the following Pre-Procedure details were reviewed:  - Risks and Benefits: Discussed the potential benefits of OMT in reducing headache frequency and intensity, and the risks of temporary discomfort or muscle soreness.  - Alternative Options: Discussed the use of prescribed medication for migraine management and the possibility of physical therapy.  - Side effects: Temporary discomfort or muscle soreness.  - Consent: Verbal consent obtained.    Intra-Procedure:    Post-Procedure:  - Tolerance Level: Patient tolerated the procedure well.  - Home Care Instructions: Discussed the importance of frequent treatments for long-term benefits and suggested scheduling an appointment with the patient's roommate to learn the techniques for home use.    Return in about 2 months (around 6/21/2025) for f/u ASAP OMT headaches.    Please note that this dictation was created using voice recognition software. I have made every reasonable attempt to correct obvious errors, but I expect that there are errors of grammar and possibly content that I did not discover before finalizing the note.

## 2025-08-12 ENCOUNTER — OFFICE VISIT (OUTPATIENT)
Dept: URGENT CARE | Facility: PHYSICIAN GROUP | Age: 30
End: 2025-08-12
Payer: COMMERCIAL

## 2025-08-12 ENCOUNTER — HOSPITAL ENCOUNTER (OUTPATIENT)
Facility: MEDICAL CENTER | Age: 30
End: 2025-08-12
Payer: COMMERCIAL

## 2025-08-12 VITALS
SYSTOLIC BLOOD PRESSURE: 98 MMHG | WEIGHT: 132 LBS | DIASTOLIC BLOOD PRESSURE: 68 MMHG | OXYGEN SATURATION: 98 % | HEIGHT: 64 IN | RESPIRATION RATE: 18 BRPM | TEMPERATURE: 97.6 F | HEART RATE: 67 BPM | BODY MASS INDEX: 22.53 KG/M2

## 2025-08-12 DIAGNOSIS — R30.0 DYSURIA: ICD-10-CM

## 2025-08-12 DIAGNOSIS — N30.00 ACUTE CYSTITIS WITHOUT HEMATURIA: ICD-10-CM

## 2025-08-12 LAB
APPEARANCE UR: CLEAR
BILIRUB UR STRIP-MCNC: NORMAL MG/DL
COLOR UR AUTO: NORMAL
GLUCOSE UR STRIP.AUTO-MCNC: 100 MG/DL
KETONES UR STRIP.AUTO-MCNC: 15 MG/DL
LEUKOCYTE ESTERASE UR QL STRIP.AUTO: NORMAL
NITRITE UR QL STRIP.AUTO: NORMAL
PH UR STRIP.AUTO: 6 [PH] (ref 5–8)
POCT INT CON NEG: NEGATIVE
POCT INT CON POS: POSITIVE
POCT URINE PREGNANCY TEST: NEGATIVE
PROT UR QL STRIP: 30 MG/DL
RBC UR QL AUTO: NORMAL
SP GR UR STRIP.AUTO: 1.01
UROBILINOGEN UR STRIP-MCNC: 4 MG/DL

## 2025-08-12 PROCEDURE — 3074F SYST BP LT 130 MM HG: CPT

## 2025-08-12 PROCEDURE — 87186 SC STD MICRODIL/AGAR DIL: CPT

## 2025-08-12 PROCEDURE — 87086 URINE CULTURE/COLONY COUNT: CPT

## 2025-08-12 PROCEDURE — 87077 CULTURE AEROBIC IDENTIFY: CPT

## 2025-08-12 PROCEDURE — 81002 URINALYSIS NONAUTO W/O SCOPE: CPT

## 2025-08-12 PROCEDURE — 81025 URINE PREGNANCY TEST: CPT

## 2025-08-12 PROCEDURE — 99213 OFFICE O/P EST LOW 20 MIN: CPT

## 2025-08-12 PROCEDURE — 3078F DIAST BP <80 MM HG: CPT

## 2025-08-12 RX ORDER — NITROFURANTOIN 25; 75 MG/1; MG/1
100 CAPSULE ORAL EVERY 12 HOURS
Qty: 10 CAPSULE | Refills: 0 | Status: SHIPPED | OUTPATIENT
Start: 2025-08-12 | End: 2025-08-17

## 2025-08-12 ASSESSMENT — ENCOUNTER SYMPTOMS
FLANK PAIN: 0
NAUSEA: 0
CHILLS: 0
FEVER: 0
VOMITING: 0

## 2025-08-12 ASSESSMENT — FIBROSIS 4 INDEX: FIB4 SCORE: 0.78
